# Patient Record
Sex: FEMALE | Race: WHITE
[De-identification: names, ages, dates, MRNs, and addresses within clinical notes are randomized per-mention and may not be internally consistent; named-entity substitution may affect disease eponyms.]

---

## 2017-06-19 NOTE — [facility] DISCHARGE SUMMARY E
[facility] Discharge Summary



NAME: MICHAEL PAGE

                                      MRN: [id number]

                                AGE: 76Y

ADMITTED: 06/15/2017           DISCHARGED: 06/15/2017



HISTORY OF PRESENT ILLNESS AND HOSPITAL COURSE:

This is a 76-year-old female who underwent cataract extraction of her left

eye.



DIAGNOSIS:

Cataract, left eye.



HOSPITAL COURSE:

She underwent surgery because she was having difficulty with glare from

headlights, difficulty seeing medicine bottles.



DISCHARGE INSTRUCTIONS:

1.  She should be on a regular diet.

2.  No bending at her waist and no heavy lifting.

3.  She should use her Besivance, Ilevro, and Durezol at 3 p.m. and 8 p.m.

and sleep with a rigid shield.

4.  I will see her for her one-day postoperative tomorrow.







DICTATING PHYSICIAN: VALERIA HILL M.D.



1272M              DT: 06/19/2017 1153

PHY#: 2011         DD: 06/19/2017 1134

ID:   [id number]               JOB#: [id number]       ACCT: [id number]



cc:VALERIA HILL M.D.

>

## 2017-06-19 NOTE — [facility] OPERATIVE REPORT E
[facility] Operative Report



NAME: MICHAEL PAGE

                                      MRN: [id number]

                             AGE: 76Y

DATE OF SURGERY: 06/15/2017                    ROOM:



PREOPERATIVE DIAGNOSIS:

Cataract, left eye.



POSTOPERATIVE DIAGNOSIS:

Cataract, left eye.



OPERATION:

Cataract extraction with intraocular lens implant of the left eye.



SURGEON:

VALERIA HILL M.D.



ANESTHESIA:

Topical.



PROCEDURE:

After obtaining appropriate consent, the patient's left eye was prepped and

draped in sterile fashion as well as the surgeon in a sterile manner and

cataract surgery was started.  First a paracentesis blade was used to make

a small side-port incision.  Viscoelastic was used to inflate the anterior

chamber.  Next a 2.4 mm incision was made with the paracentesis blade.  A

continuous capsulorrhexis incision was made using a cystotome and Utrata

forceps.  Following this hydrodissection was carried out to make the lens

fully loose and mobile and it was rotated 90 degrees.  Following this, a

divide-and-conquer technique was used to phacoemulsify the lens with a CDE

of 6.69. The remaining cortex was removed with irrigation/aspiration. 

Provisc was instilled into the capsular bag to inflate the bag.  A SN60WF,

20.0 diopter lens was placed.  The remaining viscoelastic material was

removed with irrigation/aspiration.  Following this, a 10-0 nylon suture

was used to close the incision and it was found to be watertight.  Vigamox

was instilled in the eye and a protective shield was placed over the eye. 

The patient returned to the postoperative recovery in stable condition.





DICTATING PHYSICIAN: VALREIA HILL M.D.



1272M              DT: 06/19/2017 1151

PHY#: 2011         DD: 06/19/2017 1134

ID:   [id number]               JOB#: [id number]       ACCT: [id number]



cc:VALERIA HILL M.D.

>

## 2019-01-31 NOTE — [facility] DISCHARGE SUMMARY E
[facility] Discharge Summary



NAME: MICHAEL PAGE

                                      MRN: [id number]

                                AGE: 78Y

ADMITTED: 01/31/2019           DISCHARGED:



This is a 78-year-old female who underwent cataract extraction of the right

eye.



DIAGNOSIS:

Cataract right eye.



She underwent surgery because she was having trouble seeing words on the

television.  She should be on a regular diet.  No bending at the waist and

no heavy lifting.  She should use her Besivance, Prolensa, and Durezol at

3:00 p.m. and 8:00 p.m. and sleep with a rigid shield.  I will see her for

her 1-day postop tomorrow.





DICTATING PHYSICIAN: VALERIA HILL M.D.



1217M              DT: 01/31/2019 2108

PHY#: 2011         DD: 01/31/2019 2050

ID:   [id number]               JOB#: [id number]       ACCT: [id number]



cc:VALERIA HILL M.D.

>

## 2019-01-31 NOTE — [facility] OPERATIVE REPORT E
[facility] Operative Report



NAME: MICHAEL PAGE

                                      MRN: [id number]

                             AGE: 78Y

DATE OF SURGERY: 01/31/2019                   ROOM:



 

PREOPERATIVE DIAGNOSIS:

CATARACT, RIGHT EYE.



POSTOPERATIVE DIAGNOSIS:

CATARACT, RIGHT EYE.



OPERATION:

Cataract extraction with insertion of an IOL of the right eye.



SURGEON:

VALERIA HILL M.D.



ANESTHESIA:

Topical.



PROCEDURE:

After obtaining appropriate consent, the patient's right eye was prepped

and draped in sterile fashion as well as the surgeon in a sterile manner

and cataract surgery was started.  First a paracentesis blade was used to

make a side-port incision.  Viscoelastic was used to inflate the anterior

chamber.  Next a 2.4 mm incision was made with a 2.4 mm blade, clear

corneal temporally.  A continuous capsulorrhexis was made using a cystotome

and Utrata forceps.  Following this hydrodissection was carried out to make

the lens fully loose and mobile and it was rotated 90 degrees.  Following

this, a divide-and-conquer technique was used to phacoemulsify the lens

with a CDE of 7.71. The remaining cortex was removed with

irrigation/aspiration.  Provisc was instilled into the capsular bag to

inflate the bag.  A SN60WF, 19.0 diopter lens was placed.  The remaining

viscoelastic material was removed with irrigation/aspiration.  Following

this, the incision was found to be watertight.  Besivance was instilled

into the eye and a protective shield was placed over the eye.   The patient

returned to the postoperative recovery in stable condition.





DICTATING PHYSICIAN: VALERIA HILL M.D.



1217M              DT: 01/31/2019 2106

PHY#: 2011         DD: 01/31/2019 2050

ID:   [id number]               JOB#: [id number]       ACCT: [id number]



cc:VALERIA HILL M.D.

>

## 2019-08-07 NOTE — RADIOLOGY REPORT (SQ)
EXAM DESCRIPTION:  BARIUM SWALLOW ESOPHAGUS



COMPLETED DATE/TIME:  8/7/2019 8:55 am



REASON FOR STUDY:  EARLY SATIETY (R68.81) R68.81  EARLY SATIETY



COMPARISON:  None.



TECHNIQUE:  Under fluoroscopic guidance, patient ingested effervescent granules followed by thick and
 thin barium. Fluoroscopic spot images and routine radiographic images acquired and stored on PACS.

12 MM BARIUM TABLET GIVEN: Yes.

No significant delay in passage.



LIMITATIONS:  None.



FLUOROSCOPY TIME:  FLUORO TIME: 1 minutes 42 seconds of fluoroscopy was used.

8 images saved to PACS.



FINDINGS:  NEUROMUSCULAR COORDINATION OF SWALLOW: Normal. No aspiration.

ESOPHAGEAL MOTILITY: Weak primary peristalsis with tertiary contractions in the distal half esophagus
.

ESOPHAGEAL MUCOSA: Normal mucosa without masses or ulceration.

GASTRO-ESOPHAGEAL JUNCTION: No hiatal hernia.  Mild gastroesophageal reflux.  12 mm barium tablet pas
sed through the GE junction without delay.

NON-GI TRACT STRUCTURES: No significant finding.

OTHER: No other significant finding.



IMPRESSION:  ESOPHAGEAL DYSMOTILITY WITH REFLUX.



COMMENT:  Quality :  Final reports for procedures using fluoroscopy that document radiation exp
osure indices, or exposure time and number of fluorographic images (if radiation exposure indices are
 not available)



TECHNICAL DOCUMENTATION:  JOB ID:  [id number]

 2011 [employer]- All Rights Reserved



Reading location - IP/workstation name: [email or web address]

## 2019-10-09 ENCOUNTER — HOSPITAL ENCOUNTER (OUTPATIENT)
Dept: HOSPITAL 62 - END | Age: 79
Discharge: HOME | End: 2019-10-09
Attending: INTERNAL MEDICINE
Payer: MEDICARE

## 2019-10-09 VITALS — DIASTOLIC BLOOD PRESSURE: 65 MMHG | SYSTOLIC BLOOD PRESSURE: 132 MMHG

## 2019-10-09 DIAGNOSIS — Z87.891: ICD-10-CM

## 2019-10-09 DIAGNOSIS — Z79.51: ICD-10-CM

## 2019-10-09 DIAGNOSIS — E03.9: ICD-10-CM

## 2019-10-09 DIAGNOSIS — Z79.899: ICD-10-CM

## 2019-10-09 DIAGNOSIS — I10: ICD-10-CM

## 2019-10-09 DIAGNOSIS — K29.50: Primary | ICD-10-CM

## 2019-10-09 DIAGNOSIS — K31.7: ICD-10-CM

## 2019-10-09 PROCEDURE — 43239 EGD BIOPSY SINGLE/MULTIPLE: CPT

## 2019-10-09 PROCEDURE — 88305 TISSUE EXAM BY PATHOLOGIST: CPT

## 2019-10-09 PROCEDURE — 88342 IMHCHEM/IMCYTCHM 1ST ANTB: CPT

## 2019-10-09 PROCEDURE — 88341 IMHCHEM/IMCYTCHM EA ADD ANTB: CPT

## 2019-10-09 RX ADMIN — MIDAZOLAM HYDROCHLORIDE ONE MG: 1 INJECTION, SOLUTION INTRAMUSCULAR; INTRAVENOUS at 08:03

## 2019-10-09 RX ADMIN — MIDAZOLAM HYDROCHLORIDE ONE MG: 1 INJECTION, SOLUTION INTRAMUSCULAR; INTRAVENOUS at 08:00

## 2019-10-09 NOTE — OPERATIVE REPORT
Operative Report


DATE OF SURGERY: 10/09/19


Operative Report: 





The risks benefits and alternatives of the procedure explained to the patient in

detail and informed consent is obtained.A GIF Olympus video scope was inserted 

into the patient's mouth and hypopharynx, the esophagus is identified intubated 

and insufflated, the scope was then advanced through the esophagus stomach and 

duodenum, retroflexion maneuver is done, the esophagus stomach and first and 

second portions of the duodenum examined.


PREOPERATIVE DIAGNOSIS: Epigastric pain


POSTOPERATIVE DIAGNOSIS: Gastritis status post biopsy rule out Helicobacter 

pylori.  Gastric nodule/polyp status post biopsy it is suggestive of a 

inflammatory polyp


OPERATION: EGD with biopsy


SURGEON: DARYN LECHUGA


ANESTHESIA: Moderate Sedation - 3 mg of Versed, 25 mcg of fentanyl.  Conscious 

sedation monitoring time 30 minutes.


TISSUE REMOVED OR ALTERED: As noted above.


COMPLICATIONS: 





None.


ESTIMATED BLOOD LOSS: None.


INTRAOPERATIVE FINDINGS: As noted above.


PROCEDURE: 





Patient tolerated the procedure well.


No immediate postprocedure complications are noted.


Patient is discharged in good condition.


Discharge date 10/9/2019.


Discharge diet: Regular.


Discharge activity: Regular.


2 to 3-week follow-up to discuss findings.


Patient is instructed to call the office or proceed to the emergency room should

there be any further problems or questions.


Wait on the pathology.

## 2019-10-30 ENCOUNTER — HOSPITAL ENCOUNTER (OUTPATIENT)
Dept: HOSPITAL 62 - RAD | Age: 79
End: 2019-10-30
Attending: SURGERY
Payer: MEDICARE

## 2019-10-30 DIAGNOSIS — K80.20: ICD-10-CM

## 2019-10-30 DIAGNOSIS — I70.90: ICD-10-CM

## 2019-10-30 DIAGNOSIS — K57.30: ICD-10-CM

## 2019-10-30 DIAGNOSIS — K31.89: Primary | ICD-10-CM

## 2019-10-30 PROCEDURE — 82565 ASSAY OF CREATININE: CPT

## 2019-10-30 PROCEDURE — 74177 CT ABD & PELVIS W/CONTRAST: CPT

## 2019-10-30 NOTE — RADIOLOGY REPORT (SQ)
EXAM DESCRIPTION:  CT ABD/PELVIS WITH IV   ORAL



COMPLETED DATE/TIME:  10/30/2019 10:23 am



REASON FOR STUDY:  (K31.89)OTHER DISEASES OF STOMACH AND DUODENUM K31.89  OTHER DISEASES OF STOMACH A
ND DUODENUM



COMPARISON:  None.



TECHNIQUE:  CT scan of the abdomen and pelvis performed with intravenous and oral contrast using antoinette
sue scanning technique with dynamic intravenous contrast injection. Images reviewed with lung, soft t
issue, and bone windows. Reconstructed coronal and sagittal MPR images reviewed. Delayed images for e
valuation of the urinary system also acquired. All images stored on PACS.

All CT scanners at this facility use dose modulation, iterative reconstruction, and/or weight based d
osing when appropriate to reduce radiation dose to as low as reasonably achievable (ALARA).

[facility]: Dose Right  [facility]: CareDose    [facility]: Dose Right    [facility]: Teradose 4D    [facility]: Smart Technologies



CONTRAST TYPE AND DOSE:  contrast/concentration: Isovue 350.00 mg/ml; Total Contrast Delivered: 80.0 
ml; Total Saline Delivered: 68.0 ml



RENAL FUNCTION:  GFR > 60.



RADIATION DOSE:  CT Rad equipment meets quality standard of care and radiation dose reduction techniq
ues were employed. CTDIvol: 9.3 - 11.0 mGy. DLP: 966 mGy-cm..



LIMITATIONS:  None.



FINDINGS:  LOWER CHEST: No significant findings. No nodules or infiltrates.

LIVER: Tiny subcentimeter low-density lesion in the central liver without gross enhancement.  Probabl
e cyst but too small to further characterize.  Liver otherwise normal.

SPLEEN: Normal size. No focal lesions.

PANCREAS: No masses. No significant calcifications. No adjacent inflammation or peripancreatic fluid 
collections. Pancreatic duct not dilated.

GALLBLADDER: Cholelithiasis.  No CT evidence acute cholecystitis.

ADRENAL GLANDS: No significant masses or asymmetry.

RIGHT KIDNEY AND URETER: No solid masses. No significant calcification. No hydronephrosis or hydroure
ter.

LEFT KIDNEY AND URETER: No solid masses. No significant calcification. No hydronephrosis or hydrouret
er.

AORTA AND VESSELS: Atherosclerotic.  No aneurysm or dissection.  Major arterial and venous structures
 look patent.

RETROPERITONEUM: No retroperitoneal adenopathy, hemorrhage or masses.

BOWEL AND PERITONEAL CAVITY: No gross gastric mass allowing for limited evaluation.  No small bowel o
bstruction.  No significant hiatal hernia.  The cecum projects well across the midline into the left 
lower quadrant.  No overtly suspicious wall thickening.  There is diverticulosis throughout the sigmo
id colon which is pronounced.  Doubt active diverticulitis.

APPENDIX: Normal.

PELVIS: No significant masses.  Normal bladder. No free fluid.

ABDOMINAL WALL: No masses. No hernias.

BONES: No significant or acute findings.

OTHER: No other significant finding.



IMPRESSION:

1. Cholelithiasis without CT evidence of acute cholecystitis.

2. Extensive sigmoid diverticulosis without CT suggestion of active diverticulitis.

3. Atherosclerosis.



TECHNICAL DOCUMENTATION:  JOB ID:  [id number]

Quality ID # [id number]: Final reports with documentation of one or more dose reduction techniques (e.g., Au
tomated exposure control, adjustment of the mA and/or kV according to patient size, use of iterative 
reconstruction technique)

 2011 [employer]- All Rights Reserved



Reading location - IP/workstation name: [email or web address]

## 2019-11-25 LAB
ADD MANUAL DIFF: NO
ANION GAP SERPL CALC-SCNC: 10 MMOL/L (ref 5–19)
BASOPHILS # BLD AUTO: 0.1 10^3/UL (ref 0–0.2)
BASOPHILS NFR BLD AUTO: 1.7 % (ref 0–2)
BUN SERPL-MCNC: 16 MG/DL (ref 7–20)
CALCIUM: 9.5 MG/DL (ref 8.4–10.2)
CHLORIDE SERPL-SCNC: 105 MMOL/L (ref 98–107)
CO2 SERPL-SCNC: 27 MMOL/L (ref 22–30)
EOSINOPHIL # BLD AUTO: 0.1 10^3/UL (ref 0–0.6)
EOSINOPHIL NFR BLD AUTO: 2.1 % (ref 0–6)
ERYTHROCYTE [DISTWIDTH] IN BLOOD BY AUTOMATED COUNT: 14.5 % (ref 11.5–14)
GLUCOSE SERPL-MCNC: 92 MG/DL (ref 75–110)
HCT VFR BLD CALC: 37.7 % (ref 36–47)
HGB BLD-MCNC: 12.7 G/DL (ref 12–15.5)
LYMPHOCYTES # BLD AUTO: 1.2 10^3/UL (ref 0.5–4.7)
LYMPHOCYTES NFR BLD AUTO: 28.1 % (ref 13–45)
MCH RBC QN AUTO: 28.4 PG (ref 27–33.4)
MCHC RBC AUTO-ENTMCNC: 33.7 G/DL (ref 32–36)
MCV RBC AUTO: 84 FL (ref 80–97)
MONOCYTES # BLD AUTO: 0.3 10^3/UL (ref 0.1–1.4)
MONOCYTES NFR BLD AUTO: 8.2 % (ref 3–13)
NEUTROPHILS # BLD AUTO: 2.5 10^3/UL (ref 1.7–8.2)
NEUTS SEG NFR BLD AUTO: 59.9 % (ref 42–78)
PLATELET # BLD: 187 10^3/UL (ref 150–450)
POTASSIUM SERPL-SCNC: 4.1 MMOL/L (ref 3.6–5)
RBC # BLD AUTO: 4.47 10^6/UL (ref 3.72–5.28)
TOTAL CELLS COUNTED % (AUTO): 100 %
WBC # BLD AUTO: 4.2 10^3/UL (ref 4–10.5)

## 2019-12-03 ENCOUNTER — HOSPITAL ENCOUNTER (INPATIENT)
Dept: HOSPITAL 62 - OROUT | Age: 79
LOS: 3 days | Discharge: HOME | DRG: 328 | End: 2019-12-06
Attending: SURGERY | Admitting: SURGERY
Payer: MEDICARE

## 2019-12-03 DIAGNOSIS — Z23: ICD-10-CM

## 2019-12-03 DIAGNOSIS — Z88.8: ICD-10-CM

## 2019-12-03 DIAGNOSIS — I10: ICD-10-CM

## 2019-12-03 DIAGNOSIS — Z79.899: ICD-10-CM

## 2019-12-03 DIAGNOSIS — Z79.890: ICD-10-CM

## 2019-12-03 DIAGNOSIS — Z87.891: ICD-10-CM

## 2019-12-03 DIAGNOSIS — K80.50: ICD-10-CM

## 2019-12-03 DIAGNOSIS — K31.89: Primary | ICD-10-CM

## 2019-12-03 PROCEDURE — S0028 INJECTION, FAMOTIDINE, 20 MG: HCPCS

## 2019-12-03 PROCEDURE — 0D164ZA BYPASS STOMACH TO JEJUNUM, PERCUTANEOUS ENDOSCOPIC APPROACH: ICD-10-PCS | Performed by: SURGERY

## 2019-12-03 PROCEDURE — 93005 ELECTROCARDIOGRAM TRACING: CPT

## 2019-12-03 PROCEDURE — 88305 TISSUE EXAM BY PATHOLOGIST: CPT

## 2019-12-03 PROCEDURE — 0DHA3UZ INSERTION OF FEEDING DEVICE INTO JEJUNUM, PERCUTANEOUS APPROACH: ICD-10-PCS | Performed by: SURGERY

## 2019-12-03 PROCEDURE — 88342 IMHCHEM/IMCYTCHM 1ST ANTB: CPT

## 2019-12-03 PROCEDURE — 86900 BLOOD TYPING SEROLOGIC ABO: CPT

## 2019-12-03 PROCEDURE — 86901 BLOOD TYPING SEROLOGIC RH(D): CPT

## 2019-12-03 PROCEDURE — 85025 COMPLETE CBC W/AUTO DIFF WBC: CPT

## 2019-12-03 PROCEDURE — 93010 ELECTROCARDIOGRAM REPORT: CPT

## 2019-12-03 PROCEDURE — 80048 BASIC METABOLIC PNL TOTAL CA: CPT

## 2019-12-03 PROCEDURE — C9290 INJ, BUPIVACAINE LIPOSOME: HCPCS

## 2019-12-03 PROCEDURE — 90686 IIV4 VACC NO PRSV 0.5 ML IM: CPT

## 2019-12-03 PROCEDURE — 36415 COLL VENOUS BLD VENIPUNCTURE: CPT

## 2019-12-03 PROCEDURE — 83690 ASSAY OF LIPASE: CPT

## 2019-12-03 PROCEDURE — 86850 RBC ANTIBODY SCREEN: CPT

## 2019-12-03 PROCEDURE — 0FT44ZZ RESECTION OF GALLBLADDER, PERCUTANEOUS ENDOSCOPIC APPROACH: ICD-10-PCS | Performed by: SURGERY

## 2019-12-03 PROCEDURE — 0DB74ZZ EXCISION OF STOMACH, PYLORUS, PERCUTANEOUS ENDOSCOPIC APPROACH: ICD-10-PCS | Performed by: SURGERY

## 2019-12-03 PROCEDURE — 88331 PATH CONSLTJ SURG 1 BLK 1SPC: CPT

## 2019-12-03 RX ADMIN — HYDROMORPHONE HYDROCHLORIDE ONE MG: 2 INJECTION INTRAMUSCULAR; INTRAVENOUS; SUBCUTANEOUS at 12:39

## 2019-12-03 RX ADMIN — METRONIDAZOLE SCH MLS/HR: 500 INJECTION, SOLUTION INTRAVENOUS at 14:18

## 2019-12-03 RX ADMIN — FAMOTIDINE SCH MG: 10 INJECTION INTRAVENOUS at 21:25

## 2019-12-03 RX ADMIN — HEPARIN SODIUM SCH: 5000 INJECTION, SOLUTION INTRAVENOUS; SUBCUTANEOUS at 21:47

## 2019-12-03 RX ADMIN — Medication SCH MG: at 18:07

## 2019-12-03 RX ADMIN — HYDROMORPHONE HYDROCHLORIDE PRN MG: 2 INJECTION INTRAMUSCULAR; INTRAVENOUS; SUBCUTANEOUS at 21:40

## 2019-12-03 RX ADMIN — METRONIDAZOLE SCH MLS/HR: 500 INJECTION, SOLUTION INTRAVENOUS at 18:47

## 2019-12-03 RX ADMIN — DEXTROSE, SODIUM CHLORIDE, AND POTASSIUM CHLORIDE PRN MLS/HR: 5; .45; .15 INJECTION INTRAVENOUS at 19:37

## 2019-12-03 RX ADMIN — CEFAZOLIN SCH MLS/HR: 1 INJECTION, POWDER, FOR SOLUTION INTRAVENOUS at 21:43

## 2019-12-03 RX ADMIN — DEXAMETHASONE SODIUM PHOSPHATE PRN MG: 10 INJECTION INTRAMUSCULAR; INTRAVENOUS at 15:07

## 2019-12-03 RX ADMIN — DEXAMETHASONE SODIUM PHOSPHATE PRN MG: 10 INJECTION INTRAMUSCULAR; INTRAVENOUS at 21:25

## 2019-12-03 RX ADMIN — HEPARIN SODIUM SCH: 5000 INJECTION, SOLUTION INTRAVENOUS; SUBCUTANEOUS at 13:54

## 2019-12-03 RX ADMIN — HYDROMORPHONE HYDROCHLORIDE ONE MG: 2 INJECTION INTRAMUSCULAR; INTRAVENOUS; SUBCUTANEOUS at 12:29

## 2019-12-03 RX ADMIN — Medication SCH: at 13:54

## 2019-12-03 RX ADMIN — MORPHINE SULFATE PRN MG: 10 INJECTION INTRAMUSCULAR; INTRAVENOUS; SUBCUTANEOUS at 14:18

## 2019-12-03 NOTE — OPERATIVE REPORT
Nonrecallable Operative Report


DATE OF SURGERY: 12/03/19


PREOPERATIVE DIAGNOSIS: gastric mass, cholelithiasis


POSTOPERATIVE DIAGNOSIS: gastric mass, cholelilthiasis


OPERATION: laparoscopic partial gastrectomy and cholecystectomy


SURGEON: URIEL CARTER


1ST ASSISTANT: PAT GIL


ANESTHESIA: GA


TISSUE REMOVED OR ALTERED: gastric antrum and gallbladder


COMPLICATIONS: 





none





ESTIMATED BLOOD LOSS: 75


INTRAOPERATIVE FINDINGS: see dictation


PROCEDURE: 





Patient was brought to the operating room awake alert in stable condition placed

on the operating table supine position induced under general anesthesia 

intubated.  A Humphrey catheter was placed.  After appropriate timeout site 

verification the procedure commenced.





Veress needle was placed into the umbilicus and the abdomen was insufflated with

6 L of CO2 gas infra umbilical 10 mm incision was made with a 15 blade and a 10 

mm port placed in the abdominal cavity intra-abdominal visualization revealed no

evidence of Veress needle or trocar injury.





Under direct vision a 5 mm right upper quadrant trocar was placed a right lower 

quadrant 12 mm port was placed in a left-sided 5 mm port and an epigastric 5 mm 

port all under direct vision.





The stomach was identified the mass could not easily be seen on the serosal side

of the stomach.  The duodenum was also identified.  There was some adhesions of 

the duodenum to the gallbladder which were taken down with sharp dissection 

using the LigaSure device.





Then using the LigaSure device we gained access to the lesser sac by dissecting 

the gastrocolic ligament away from the greater curvature the stomach from the 

duodenum all the way to the incisura.  Once this was accomplished we continued 

our dissection right word to mobilize the first portion of the duodenum and 

identified the pylorus.





We then came across the first portion of the duodenum just distal to the pylorus

with one firing of the Endo KENRICK stapler with a blue load.  The staple line was 

reinforced with interrupted 2-0 silk.





We then completely mobilized the lesser curvature the stomach with the LigaSure 

device and then came across the distal stomach proximal to the incisura with 2 

firings of the Endo KENRICK stapler with a blue load.





The gastric antrum was removed and examined and was opened and we noted the mass

just proximal to the pylorus there was sent that down the pathology.





Pathology report came back as the mass was benign.





We then turned attention to the ligament of Treitz a point was picked distal 

from the ligament Treitz about 40 cm we divided the small bowel with the Endo 

KENRICK stapler with a blue load.  We then divided the mesentery with LigaSure 

device.  We created an enteroenterostomy between the biliary limb and the 

alimentary limb about 60 cm distal from the end of the staple line  on the end 

of the Boogie limb.





This was done with the Endo KENRICK stapler the staple holes were closed 

transversely with a stapler that we were reinforced with 2-0 silk and the 

mesenteric defect was closed with 3-0 Maxon.





We then used the oral device 25 mm we passed into the mouth setting the anvil in

the distal stomach.





We placed the EEA stapler to the left upper quadrant incision into the end of 

the rhythm appears to the outside connected to the anvil and the  stomach closed

and fired creating a gastrojejunostomy.  That was also reinforced with 

interrupted 2-0 silk.





The donuts were examined and they were intact.





We then turned attention to the gallbladder was placed on traction the 

hepatoduodenal ligament was dissected isolated and cystic duct and cystic artery

both the structures were doubly ligated with a Endo Clip and divided leaving 2 

Endoloops on the stay side of both duct and artery.





The gallbladder was then dissected out of the liver bed with Bovie cautery 

placed in Endobag and removed through the right lower quadrant port site.  

Hemostasis of the liver bed was obtained with Bovie cautery.





After good hemostasis the ned hepatis was examined again for examination of 

the duct stump and it was noted to be intact the surgical anastomoses were all 

again reexamined and all noted to be intact without any twists or defects in the

mesentery.  We then used a Thanh drain left in the ned hepatis next to the 

duodenal stump and in the ned hepatis and brought that out through a stab 

wound in the right upper quadrant.





We then reduce the pneumoperitoneum remove the ports closed the right lower 

quadrant and left lower quadrant fascial defects with 0 Vicryl in the fascia and

closed the umbilical port site with 0 Vicryl in the fascia.





The skin was then closed with intracuticular 4-0 Biosyn and then Steri-Strips 

completed the procedure.





Estimated blood loss for the procedure was 75 cc sponge and needle counts were 

correct x2.





The patient was awakened in the operating extubated transferred recovery in 

stable condition.





Pat PARSONS was present for the entire procedure for help with wound 

retraction wound closure.

## 2019-12-04 LAB
ADD MANUAL DIFF: NO
ANION GAP SERPL CALC-SCNC: 6 MMOL/L (ref 5–19)
BASOPHILS # BLD AUTO: 0 10^3/UL (ref 0–0.2)
BASOPHILS NFR BLD AUTO: 0.1 % (ref 0–2)
BUN SERPL-MCNC: 15 MG/DL (ref 7–20)
CALCIUM: 8.4 MG/DL (ref 8.4–10.2)
CHLORIDE SERPL-SCNC: 104 MMOL/L (ref 98–107)
CO2 SERPL-SCNC: 27 MMOL/L (ref 22–30)
EOSINOPHIL # BLD AUTO: 0 10^3/UL (ref 0–0.6)
EOSINOPHIL NFR BLD AUTO: 0 % (ref 0–6)
ERYTHROCYTE [DISTWIDTH] IN BLOOD BY AUTOMATED COUNT: 14.3 % (ref 11.5–14)
GLUCOSE SERPL-MCNC: 121 MG/DL (ref 75–110)
HCT VFR BLD CALC: 33.4 % (ref 36–47)
HGB BLD-MCNC: 11.2 G/DL (ref 12–15.5)
LYMPHOCYTES # BLD AUTO: 0.7 10^3/UL (ref 0.5–4.7)
LYMPHOCYTES NFR BLD AUTO: 6.9 % (ref 13–45)
MCH RBC QN AUTO: 28.3 PG (ref 27–33.4)
MCHC RBC AUTO-ENTMCNC: 33.5 G/DL (ref 32–36)
MCV RBC AUTO: 84 FL (ref 80–97)
MONOCYTES # BLD AUTO: 0.6 10^3/UL (ref 0.1–1.4)
MONOCYTES NFR BLD AUTO: 6.2 % (ref 3–13)
NEUTROPHILS # BLD AUTO: 8.8 10^3/UL (ref 1.7–8.2)
NEUTS SEG NFR BLD AUTO: 86.8 % (ref 42–78)
PLATELET # BLD: 168 10^3/UL (ref 150–450)
POTASSIUM SERPL-SCNC: 3.9 MMOL/L (ref 3.6–5)
RBC # BLD AUTO: 3.96 10^6/UL (ref 3.72–5.28)
TOTAL CELLS COUNTED % (AUTO): 100 %
WBC # BLD AUTO: 10.1 10^3/UL (ref 4–10.5)

## 2019-12-04 RX ADMIN — Medication SCH MG: at 17:32

## 2019-12-04 RX ADMIN — DEXTROSE, SODIUM CHLORIDE, AND POTASSIUM CHLORIDE PRN MLS/HR: 5; .45; .15 INJECTION INTRAVENOUS at 20:14

## 2019-12-04 RX ADMIN — HYDROMORPHONE HYDROCHLORIDE PRN MG: 2 INJECTION INTRAMUSCULAR; INTRAVENOUS; SUBCUTANEOUS at 01:58

## 2019-12-04 RX ADMIN — LEVOTHYROXINE SODIUM SCH: 50 TABLET ORAL at 10:23

## 2019-12-04 RX ADMIN — HEPARIN SODIUM SCH: 5000 INJECTION, SOLUTION INTRAVENOUS; SUBCUTANEOUS at 06:13

## 2019-12-04 RX ADMIN — METRONIDAZOLE SCH MLS/HR: 500 INJECTION, SOLUTION INTRAVENOUS at 06:38

## 2019-12-04 RX ADMIN — HYDROMORPHONE HYDROCHLORIDE PRN MG: 2 INJECTION INTRAMUSCULAR; INTRAVENOUS; SUBCUTANEOUS at 23:18

## 2019-12-04 RX ADMIN — METOCLOPRAMIDE SCH MG: 5 INJECTION, SOLUTION INTRAMUSCULAR; INTRAVENOUS at 17:32

## 2019-12-04 RX ADMIN — CEFAZOLIN SCH MLS/HR: 1 INJECTION, POWDER, FOR SOLUTION INTRAVENOUS at 05:35

## 2019-12-04 RX ADMIN — CEFAZOLIN SCH MLS/HR: 1 INJECTION, POWDER, FOR SOLUTION INTRAVENOUS at 21:53

## 2019-12-04 RX ADMIN — FAMOTIDINE SCH MG: 10 INJECTION INTRAVENOUS at 09:10

## 2019-12-04 RX ADMIN — Medication SCH MG: at 23:17

## 2019-12-04 RX ADMIN — DEXAMETHASONE SODIUM PHOSPHATE PRN MG: 10 INJECTION INTRAMUSCULAR; INTRAVENOUS at 05:46

## 2019-12-04 RX ADMIN — HEPARIN SODIUM SCH UNIT: 5000 INJECTION, SOLUTION INTRAVENOUS; SUBCUTANEOUS at 09:10

## 2019-12-04 RX ADMIN — DEXAMETHASONE SODIUM PHOSPHATE PRN MG: 10 INJECTION INTRAMUSCULAR; INTRAVENOUS at 20:07

## 2019-12-04 RX ADMIN — METOCLOPRAMIDE SCH MG: 5 INJECTION, SOLUTION INTRAMUSCULAR; INTRAVENOUS at 23:17

## 2019-12-04 RX ADMIN — METOCLOPRAMIDE SCH MG: 5 INJECTION, SOLUTION INTRAMUSCULAR; INTRAVENOUS at 11:05

## 2019-12-04 RX ADMIN — METRONIDAZOLE SCH MLS/HR: 500 INJECTION, SOLUTION INTRAVENOUS at 00:45

## 2019-12-04 RX ADMIN — DEXTROSE, SODIUM CHLORIDE, AND POTASSIUM CHLORIDE PRN MLS/HR: 5; .45; .15 INJECTION INTRAVENOUS at 09:48

## 2019-12-04 RX ADMIN — MORPHINE SULFATE PRN MG: 10 INJECTION INTRAMUSCULAR; INTRAVENOUS; SUBCUTANEOUS at 13:42

## 2019-12-04 RX ADMIN — HEPARIN SODIUM SCH UNIT: 5000 INJECTION, SOLUTION INTRAVENOUS; SUBCUTANEOUS at 21:54

## 2019-12-04 RX ADMIN — CEFAZOLIN SCH MLS/HR: 1 INJECTION, POWDER, FOR SOLUTION INTRAVENOUS at 13:34

## 2019-12-04 RX ADMIN — Medication SCH MG: at 06:11

## 2019-12-04 RX ADMIN — DEXAMETHASONE SODIUM PHOSPHATE PRN MG: 10 INJECTION INTRAMUSCULAR; INTRAVENOUS at 13:42

## 2019-12-04 RX ADMIN — FAMOTIDINE SCH MG: 10 INJECTION INTRAVENOUS at 21:54

## 2019-12-04 RX ADMIN — Medication SCH MG: at 01:59

## 2019-12-04 RX ADMIN — Medication SCH MG: at 11:05

## 2019-12-04 NOTE — PDOC PROGRESS REPORT
Subjective


Progress Note for:: 12/04/19


Subjective:: 





feels ok


pain better,


has had some nausea, dry heaves


Reason For Visit: 


K31.89 OTHER DISEASES OF STOMACH AND DUODENUM








Physical Exam


Vital Signs: 


                                        











Temp Pulse Resp BP Pulse Ox


 


 98.7 F   84   18   140/69 H  99 


 


 12/04/19 00:00  12/04/19 00:00  12/04/19 00:00  12/04/19 00:00  12/04/19 04:47








                                 Intake & Output











 12/03/19 12/04/19 12/05/19





 06:59 06:59 06:59


 


Intake Total 0 3650 


 


Output Total  1365 


 


Balance 0 2285 


 


Weight 71.21 kg 81.2 kg 











General appearance: PRESENT: no acute distress


Head exam: PRESENT: normocephalic


Eye exam: PRESENT: EOMI


Mouth exam: PRESENT: moist


Neck exam: PRESENT: full ROM


Respiratory exam: PRESENT: clear to auscultation leyla


Cardiovascular exam: PRESENT: RRR


Pulses: PRESENT: normal radial pulses, normal femoral pulses


GI/Abdominal exam: PRESENT: soft


Rectal exam: PRESENT: deferred


Extremities exam: PRESENT: full ROM


Musculoskeletal exam: PRESENT: full ROM


Neurological exam: PRESENT: alert, awake, oriented to person, oriented to place


Skin exam: PRESENT: dry





Results


Laboratory Results: 


                                        





                                 12/04/19 06:05 





                                 12/04/19 06:05 





                                        











  12/03/19 12/04/19 12/04/19





  07:55 06:05 06:05


 


WBC   10.1 


 


RBC   3.96 


 


Hgb   11.2 L 


 


Hct   33.4 L 


 


MCV   84 


 


MCH   28.3 


 


MCHC   33.5 


 


RDW   14.3 H 


 


Plt Count   168 


 


Seg Neutrophils %   86.8 H 


 


Sodium    136.5 L


 


Potassium    3.9


 


Chloride    104


 


Carbon Dioxide    27


 


Anion Gap    6


 


BUN    15


 


Creatinine    0.51 L


 


Est GFR ( Amer)    > 60


 


Glucose    121 H


 


Calcium    8.4


 


Lipase    50.3


 


Blood Type  A POSITIVE  


 


Antibody Screen  NEGATIVE  














Assessment & Plan





- Time


Time Spent with patient: 25-34 minutes





- Plan Summary


Plan Summary: 





pod 1


s/p partial gastrectomy with angela y reconstruction


s/p cholecystectomy


doing ok this am


min nausea


melisa iwth min op


urine op good


plan


will strt reglan


dc zavaleta


start clears


dc flagyl

## 2019-12-05 RX ADMIN — METOCLOPRAMIDE SCH MG: 5 INJECTION, SOLUTION INTRAMUSCULAR; INTRAVENOUS at 11:09

## 2019-12-05 RX ADMIN — LEVOTHYROXINE SODIUM SCH MG: 50 TABLET ORAL at 05:42

## 2019-12-05 RX ADMIN — Medication SCH: at 17:16

## 2019-12-05 RX ADMIN — HYDROMORPHONE HYDROCHLORIDE PRN MG: 2 INJECTION INTRAMUSCULAR; INTRAVENOUS; SUBCUTANEOUS at 07:44

## 2019-12-05 RX ADMIN — DEXTROSE, SODIUM CHLORIDE, AND POTASSIUM CHLORIDE PRN MLS/HR: 5; .45; .15 INJECTION INTRAVENOUS at 09:00

## 2019-12-05 RX ADMIN — HEPARIN SODIUM SCH UNIT: 5000 INJECTION, SOLUTION INTRAVENOUS; SUBCUTANEOUS at 09:01

## 2019-12-05 RX ADMIN — Medication SCH MG: at 05:40

## 2019-12-05 RX ADMIN — CEFAZOLIN SCH MLS/HR: 1 INJECTION, POWDER, FOR SOLUTION INTRAVENOUS at 05:41

## 2019-12-05 RX ADMIN — DEXAMETHASONE SODIUM PHOSPHATE PRN MG: 10 INJECTION INTRAMUSCULAR; INTRAVENOUS at 07:44

## 2019-12-05 RX ADMIN — Medication SCH MG: at 22:27

## 2019-12-05 RX ADMIN — FAMOTIDINE SCH MG: 10 INJECTION INTRAVENOUS at 09:01

## 2019-12-05 RX ADMIN — FAMOTIDINE SCH MG: 10 INJECTION INTRAVENOUS at 22:28

## 2019-12-05 RX ADMIN — METOCLOPRAMIDE SCH MG: 5 INJECTION, SOLUTION INTRAMUSCULAR; INTRAVENOUS at 17:11

## 2019-12-05 RX ADMIN — Medication SCH MG: at 11:09

## 2019-12-05 RX ADMIN — CEFAZOLIN SCH MLS/HR: 1 INJECTION, POWDER, FOR SOLUTION INTRAVENOUS at 22:27

## 2019-12-05 RX ADMIN — DEXTROSE, SODIUM CHLORIDE, AND POTASSIUM CHLORIDE PRN MLS/HR: 5; .45; .15 INJECTION INTRAVENOUS at 22:34

## 2019-12-05 RX ADMIN — HEPARIN SODIUM SCH UNIT: 5000 INJECTION, SOLUTION INTRAVENOUS; SUBCUTANEOUS at 22:28

## 2019-12-05 RX ADMIN — CEFAZOLIN SCH MLS/HR: 1 INJECTION, POWDER, FOR SOLUTION INTRAVENOUS at 13:36

## 2019-12-05 RX ADMIN — METOCLOPRAMIDE SCH MG: 5 INJECTION, SOLUTION INTRAMUSCULAR; INTRAVENOUS at 05:40

## 2019-12-05 NOTE — PDOC PROGRESS REPORT
Subjective


Progress Note for:: 12/05/19


Subjective:: 





feels ok


vomited yesterday








Reason For Visit: 


K31.89 OTHER DISEASES OF STOMACH AND DUODENUM








Physical Exam


Vital Signs: 


                                        











Temp Pulse Resp BP Pulse Ox


 


 98.3 F   65   14   161/74 H  95 


 


 12/04/19 23:00  12/04/19 23:00  12/04/19 23:00  12/04/19 23:00  12/04/19 23:00








                                 Intake & Output











 12/04/19 12/05/19 12/06/19





 06:59 06:59 06:59


 


Intake Total 4650 1318 


 


Output Total 1365 1050 


 


Balance 3285 268 


 


Weight 81.2 kg 77.2 kg 











General appearance: PRESENT: no acute distress


Head exam: PRESENT: normocephalic


Eye exam: PRESENT: EOMI


Ear exam: PRESENT: normal external ear exam


Mouth exam: PRESENT: dry mucosa


Neck exam: PRESENT: full ROM


Respiratory exam: PRESENT: clear to auscultation leyla


Cardiovascular exam: PRESENT: RRR


Pulses: PRESENT: normal radial pulses, normal femoral pulses


GI/Abdominal exam: PRESENT: soft


Rectal exam: PRESENT: deferred


Extremities exam: PRESENT: full ROM


Musculoskeletal exam: PRESENT: full ROM


Neurological exam: PRESENT: alert, awake, oriented to person, oriented to place


Psychiatric exam: PRESENT: appropriate affect


Skin exam: PRESENT: dry





Results


Laboratory Results: 


                                        





                                 12/04/19 06:05 





                                 12/04/19 06:05 











Assessment & Plan





- Time


Time Spent with patient: 15-24 minutes





- Plan Summary


Plan Summary: 





will start full liquids


dulolax suppositiory today

## 2019-12-06 VITALS — SYSTOLIC BLOOD PRESSURE: 149 MMHG | DIASTOLIC BLOOD PRESSURE: 67 MMHG

## 2019-12-06 PROCEDURE — 3E02340 INTRODUCTION OF INFLUENZA VACCINE INTO MUSCLE, PERCUTANEOUS APPROACH: ICD-10-PCS | Performed by: SURGERY

## 2019-12-06 RX ADMIN — METOCLOPRAMIDE SCH MG: 5 INJECTION, SOLUTION INTRAMUSCULAR; INTRAVENOUS at 06:18

## 2019-12-06 RX ADMIN — METOCLOPRAMIDE SCH MG: 5 INJECTION, SOLUTION INTRAMUSCULAR; INTRAVENOUS at 00:04

## 2019-12-06 RX ADMIN — Medication SCH: at 06:12

## 2019-12-06 RX ADMIN — LEVOTHYROXINE SODIUM SCH MG: 50 TABLET ORAL at 06:18

## 2019-12-06 RX ADMIN — CEFAZOLIN SCH MLS/HR: 1 INJECTION, POWDER, FOR SOLUTION INTRAVENOUS at 06:17

## 2019-12-06 NOTE — PDOC DISCHARGE SUMMARY
General





- Admit/Disc Date/PCP


Admission Date/Primary Care Provider: 


  12/03/19 11:05





  ESTHER SWEET PA-C





Discharge Date: 12/06/19





- Discharge Diagnosis


Final Diagnosis: 





gastric mass, cholelitliasis





- Assessment


Summary: 


Ms. Page was admitted for elective partial gastrectomy and cholecystectomy 

for a known gastric mass seen as an outpatient.  She underwent the procedure on 

the day of admission tolerated well and she had a routine benign postop course 

she was started on clear liquid diet on hospital day 1 which was slowly advanced

to full liquid diet by the time of discharge today she is tolerating a full 

liquid diet having normal bowel function with normal bowel movements and is 

requesting to be discharged home and she is up and around in the hospital and 

having minimal amounts of pain she is ready for discharge today .


She will be discharged home today on a full liquid diet she will be given a 

prescription for Pepcid 20 mg p.o. twice daily she will be followed up in my 

surgery clinic 7 to 10 days after discharge.











- Additional Information


Resuscitation Status: Full Code


Discharge Diet: Full Liquids


Discharge Activity: Activity As Tolerated, No Lifting Over 10 Pounds - She needs

a follow-up appointment with me in 7 to 10 days after discharge


Referrals: 


URIEL CARTER MD [ACTIVE STAFF] -  (1 week.)


Home Medications: 








Levothyroxine Sodium [Synthroid 50 Mcg Tablet] 50 mcg PO Q6AM 05/19/17 


Cetirizine HCl [Zyrtec 10 mg Tablet] 10 mg PO DAILY 10/09/19 


Diphenhydramine HCl [Benadryl 25 mg Capsule] 25 mg PO HSP PRN 10/09/19 


Calcium Carbonate/Vitamin D3 [Calcium 500 + Vit D Caplet] 1 tab PO DAILY 

11/22/19 


Telmisartan [Micardis 20 mg Tablet] 60 mg PO DAILY 12/04/19 











History of Present Illiness


History of Present Illness: 


MICHAEL PAGE is a 79 year old female








Physical Exam


Vital Signs: 


                                        











Temp Pulse Resp BP Pulse Ox


 


 99.1 F   87   14   168/68 H  92 


 


 12/05/19 19:00  12/05/19 19:00  12/05/19 19:00  12/05/19 19:00  12/05/19 19:00








                                 Intake & Output











 12/05/19 12/06/19 12/07/19





 06:59 06:59 06:59


 


Intake Total 2318 3186 


 


Output Total 1050 10 


 


Balance 1268 3176 


 


Weight 77.2 kg 77.2 kg 














Results


Laboratory Results: 


                                        











WBC  10.1 10^3/uL (4.0-10.5)   12/04/19  06:05    


 


RBC  3.96 10^6/uL (3.72-5.28)   12/04/19  06:05    


 


Hgb  11.2 g/dL (12.0-15.5)  L  12/04/19  06:05    


 


Hct  33.4 % (36.0-47.0)  L  12/04/19  06:05    


 


MCV  84 fl (80-97)   12/04/19  06:05    


 


MCH  28.3 pg (27.0-33.4)   12/04/19  06:05    


 


MCHC  33.5 g/dL (32.0-36.0)   12/04/19  06:05    


 


RDW  14.3 % (11.5-14.0)  H  12/04/19  06:05    


 


Plt Count  168 10^3/uL (150-450)   12/04/19  06:05    


 


Lymph % (Auto)  6.9 % (13-45)  L  12/04/19  06:05    


 


Mono % (Auto)  6.2 % (3-13)   12/04/19  06:05    


 


Eos % (Auto)  0.0 % (0-6)   12/04/19  06:05    


 


Baso % (Auto)  0.1 % (0-2)   12/04/19  06:05    


 


Absolute Neuts (auto)  8.8 10^3/uL (1.7-8.2)  H  12/04/19  06:05    


 


Absolute Lymphs (auto)  0.7 10^3/uL (0.5-4.7)   12/04/19  06:05    


 


Absolute Monos (auto)  0.6 10^3/uL (0.1-1.4)   12/04/19  06:05    


 


Absolute Eos (auto)  0.0 10^3/uL (0.0-0.6)   12/04/19  06:05    


 


Absolute Basos (auto)  0.0 10^3/uL (0.0-0.2)   12/04/19  06:05    


 


Seg Neutrophils %  86.8 % (42-78)  H  12/04/19  06:05    


 


Sodium  136.5 mmol/L (137-145)  L  12/04/19  06:05    


 


Potassium  3.9 mmol/L (3.6-5.0)   12/04/19  06:05    


 


Chloride  104 mmol/L ()   12/04/19  06:05    


 


Carbon Dioxide  27 mmol/L (22-30)   12/04/19  06:05    


 


Anion Gap  6  (5-19)   12/04/19  06:05    


 


BUN  15 mg/dL (7-20)   12/04/19  06:05    


 


Creatinine  0.51 mg/dL (0.52-1.25)  L  12/04/19  06:05    


 


Est GFR ( Amer)  > 60  (>60)   12/04/19  06:05    


 


Est GFR (MDRD) Non-Af  > 60  (>60)   12/04/19  06:05    


 


Glucose  121 mg/dL ()  H  12/04/19  06:05    


 


Calcium  8.4 mg/dL (8.4-10.2)   12/04/19  06:05    


 


Lipase  50.3 U/L ()   12/04/19  06:05    


 


Blood Type  A POSITIVE   12/03/19  07:55    


 


Antibody Screen  NEGATIVE   12/03/19  07:55

## 2020-04-02 ENCOUNTER — HOSPITAL ENCOUNTER (INPATIENT)
Dept: HOSPITAL 62 - ER | Age: 80
LOS: 5 days | Discharge: HOME | DRG: 327 | End: 2020-04-07
Attending: SURGERY | Admitting: SURGERY
Payer: MEDICARE

## 2020-04-02 DIAGNOSIS — R53.1: ICD-10-CM

## 2020-04-02 DIAGNOSIS — K46.0: ICD-10-CM

## 2020-04-02 DIAGNOSIS — K31.1: Primary | ICD-10-CM

## 2020-04-02 DIAGNOSIS — Z90.3: ICD-10-CM

## 2020-04-02 DIAGNOSIS — I10: ICD-10-CM

## 2020-04-02 LAB
ADD MANUAL DIFF: NO
ALBUMIN SERPL-MCNC: 4.7 G/DL (ref 3.5–5)
ALP SERPL-CCNC: 82 U/L (ref 38–126)
ANION GAP SERPL CALC-SCNC: 15 MMOL/L (ref 5–19)
AST SERPL-CCNC: 28 U/L (ref 14–36)
BASOPHILS # BLD AUTO: 0 10^3/UL (ref 0–0.2)
BASOPHILS NFR BLD AUTO: 0.5 % (ref 0–2)
BILIRUB DIRECT SERPL-MCNC: 0.2 MG/DL (ref 0–0.4)
BILIRUB SERPL-MCNC: 0.9 MG/DL (ref 0.2–1.3)
BUN SERPL-MCNC: 35 MG/DL (ref 7–20)
CALCIUM: 10.4 MG/DL (ref 8.4–10.2)
CHLORIDE SERPL-SCNC: 103 MMOL/L (ref 98–107)
CO2 SERPL-SCNC: 25 MMOL/L (ref 22–30)
EOSINOPHIL # BLD AUTO: 0 10^3/UL (ref 0–0.6)
EOSINOPHIL NFR BLD AUTO: 0.1 % (ref 0–6)
ERYTHROCYTE [DISTWIDTH] IN BLOOD BY AUTOMATED COUNT: 20 % (ref 11.5–14)
GLUCOSE SERPL-MCNC: 146 MG/DL (ref 75–110)
HCT VFR BLD CALC: 37.6 % (ref 36–47)
HGB BLD-MCNC: 12.1 G/DL (ref 12–15.5)
LYMPHOCYTES # BLD AUTO: 1.2 10^3/UL (ref 0.5–4.7)
LYMPHOCYTES NFR BLD AUTO: 11.9 % (ref 13–45)
MCH RBC QN AUTO: 23.8 PG (ref 27–33.4)
MCHC RBC AUTO-ENTMCNC: 32.3 G/DL (ref 32–36)
MCV RBC AUTO: 74 FL (ref 80–97)
MONOCYTES # BLD AUTO: 0.7 10^3/UL (ref 0.1–1.4)
MONOCYTES NFR BLD AUTO: 6.7 % (ref 3–13)
NEUTROPHILS # BLD AUTO: 8 10^3/UL (ref 1.7–8.2)
NEUTS SEG NFR BLD AUTO: 80.8 % (ref 42–78)
PLATELET # BLD: 256 10^3/UL (ref 150–450)
POTASSIUM SERPL-SCNC: 4 MMOL/L (ref 3.6–5)
PROT SERPL-MCNC: 7.8 G/DL (ref 6.3–8.2)
RBC # BLD AUTO: 5.1 10^6/UL (ref 3.72–5.28)
TOTAL CELLS COUNTED % (AUTO): 100 %
WBC # BLD AUTO: 9.8 10^3/UL (ref 4–10.5)

## 2020-04-02 PROCEDURE — 74018 RADEX ABDOMEN 1 VIEW: CPT

## 2020-04-02 PROCEDURE — 80048 BASIC METABOLIC PNL TOTAL CA: CPT

## 2020-04-02 PROCEDURE — 43249 ESOPH EGD DILATION <30 MM: CPT

## 2020-04-02 PROCEDURE — 94799 UNLISTED PULMONARY SVC/PX: CPT

## 2020-04-02 PROCEDURE — 36415 COLL VENOUS BLD VENIPUNCTURE: CPT

## 2020-04-02 PROCEDURE — 96361 HYDRATE IV INFUSION ADD-ON: CPT

## 2020-04-02 PROCEDURE — 80076 HEPATIC FUNCTION PANEL: CPT

## 2020-04-02 PROCEDURE — 74177 CT ABD & PELVIS W/CONTRAST: CPT

## 2020-04-02 PROCEDURE — 0D9670Z DRAINAGE OF STOMACH WITH DRAINAGE DEVICE, VIA NATURAL OR ARTIFICIAL OPENING: ICD-10-PCS | Performed by: SURGERY

## 2020-04-02 PROCEDURE — C9290 INJ, BUPIVACAINE LIPOSOME: HCPCS

## 2020-04-02 PROCEDURE — 96374 THER/PROPH/DIAG INJ IV PUSH: CPT

## 2020-04-02 PROCEDURE — 83690 ASSAY OF LIPASE: CPT

## 2020-04-02 PROCEDURE — 93005 ELECTROCARDIOGRAM TRACING: CPT

## 2020-04-02 PROCEDURE — 93010 ELECTROCARDIOGRAM REPORT: CPT

## 2020-04-02 PROCEDURE — 85025 COMPLETE CBC W/AUTO DIFF WBC: CPT

## 2020-04-02 PROCEDURE — 99285 EMERGENCY DEPT VISIT HI MDM: CPT

## 2020-04-02 RX ADMIN — DEXTROSE, SODIUM CHLORIDE, AND POTASSIUM CHLORIDE PRN MLS/HR: 5; .45; .15 INJECTION INTRAVENOUS at 19:50

## 2020-04-02 NOTE — PDOC H&P
History of Present Illness


Admission Date/PCP: 


  





  ESTHER SWEET PA-C





Patient complains of: nausea vomiting


History of Present Illness: 


MICHAEL PAGE is a 79 year old female7 with a history of partial 

gastrectomy for a distal gastric mass and cholecystectomy in December, presents 

with generalized weakness nausea vomiting and decreased oral intake along with 

decreased stool output.  She was doing fine until Saturday when she developed 

nausea and vomiting and cannot tolerate liquids or solids.  She is taking Zofran

prescribed by her primary and it is not helping.  She is not had a bowel 

movement since Saturday and says that she has decreased gas as well and her 

stomach feels swollen.  Denies jaundice she denies fever.








Past Medical History


Cardiac Medical History: Reports: Hypertension


   Denies: Coronary Artery Disease, Myocardial Infarction


Pulmonary Medical History: 


   Denies: Asthma, Bronchitis, Chronic Obstructive Pulmonary Disease (COPD), 

Pneumonia


Neurological Medical History: 


   Denies: Seizures


GI Medical History: 


   Denies: Hepatitis, Hiatal Hernia


Musculoskeltal Medical History: Reports: Arthritis


Hematology: Reports: Anemia - HX OF, TOOK VIT B12 INJECTIONS


   Denies: Sickle Cell Disease





Past Surgical History


Past Surgical History: Reports: Hysterectomy


   Denies: Amputation, Mastectomy, Pacemaker





Social History


Smoking Status: Unknown if Ever Smoked





Family History


Family History: Reviewed & Not Pertinent


Parental Family History Reviewed: No


Children Family History Reviewed: NA


Sibling(s) Family History Reviewed.: NA





Medication/Allergy


Home Medications: 








Levothyroxine Sodium [Synthroid 50 Mcg Tablet] 50 mcg PO Q6AM 05/19/17 


Cetirizine HCl [Zyrtec 10 mg Tablet] 10 mg PO DAILY 10/09/19 


Diphenhydramine HCl [Benadryl 25 mg Capsule] 25 mg PO HSP PRN 10/09/19 


Calcium Carbonate/Vitamin D3 [Calcium 500 + Vit D Caplet] 1 tab PO DAILY 

11/22/19 


Telmisartan [Micardis 20 mg Tablet] 60 mg PO DAILY 12/04/19 








Allergies/Adverse Reactions: 


                                        





lisinopril Adverse Reaction (Verified 04/02/20 13:54)


   











Review of Systems


Constitutional: PRESENT: fatigue, weight loss


Eyes: ABSENT: as per HPI, visual disturbances, other


Ears: ABSENT: as per HPI, hearing changes, other


Nose, Mouth, and Throat: ABSENT: as per HPI, headache(s), mouth pain, sore 

throat, vertigo, other


Breasts: ABSENT: as per HPI, other


Cardiovascular: ABSENT: as per HPI, chest pain, dyspnea on exertion, edema, 

orthropnea, palpitations, other


Genitourinary: ABSENT: as per HPI, difficulty urinating, dysuria, hematuria, 

nocturia, other


Musculoskeletal: ABSENT: as per HPI, back pain, deformity, joint swelling, 

muscle weakness, other


Integumentary: ABSENT: as per HPI, diaphoresis, erythema, lesions, pruritus, 

rash, wounds, other


Neurological: ABSENT: as per HPI, abnormal gait, abnormal movements, abnormal 

speech, confusion, convulsions, dizziness, focal weakness, frequent falls, lack 

of coordination, memory loss, numbness, paresthesias, restless legs, syncope, 

tingling, tremor(s), vertigo, weakness, other


Psychiatric: ABSENT: as per HPI, anxiety, depression, hallucinations, homidical 

ideation, suicidal ideation, other


Endocrine: ABSENT: as per HPI, cold intolerance, flushing, heat intolerance, 

menstrual abnormalities, polydipsia, polyphagia, polyuria, other


Hematologic/Lymphatic: ABSENT: as per HPI, easy bleeding, easy bruising, 

lymphadenopathy, other


Allergic/Immunologic: ABSENT: as per HPI, seasonal rhinorrhea, other





Physical Exam


Vital Signs: 


                                        











Temp Pulse Resp BP Pulse Ox


 


 98.2 F      10 L  161/78 H  99 


 


 04/02/20 13:58     04/02/20 14:07  04/02/20 14:07  04/02/20 14:07








                                 Intake & Output











 04/01/20 04/02/20 04/03/20





 06:59 06:59 06:59


 


Intake Total   1000


 


Balance   1000


 


Weight   54.431 kg











General appearance: PRESENT: no acute distress


Head exam: PRESENT: normocephalic


Eye exam: PRESENT: EOMI


Ear exam: PRESENT: normal external ear exam


Mouth exam: PRESENT: moist


Neck exam: PRESENT: full ROM


Respiratory exam: PRESENT: clear to auscultation leyla


Cardiovascular exam: PRESENT: RRR


Pulses: PRESENT: normal radial pulses, normal femoral pulses


Vascular exam: PRESENT: normal capillary refill


Breast: PRESENT: Normal


GI/Abdominal exam: PRESENT: soft


Extremities exam: PRESENT: full ROM


Musculoskeletal exam: PRESENT: full ROM


Neurological exam: PRESENT: alert, awake, oriented to person, oriented to place


Psychiatric exam: PRESENT: appropriate affect


Skin exam: PRESENT: dry





Results


Laboratory Results: 


                                        





                                 04/02/20 14:10 





                                 04/02/20 14:10 





                                        











  04/02/20 04/02/20





  14:10 14:10


 


WBC  9.8 


 


RBC  5.10 


 


Hgb  12.1 


 


Hct  37.6 


 


MCV  74 L 


 


MCH  23.8 L 


 


MCHC  32.3 


 


RDW  20.0 H 


 


Plt Count  256 


 


Seg Neutrophils %  80.8 H 


 


Sodium   142.8


 


Potassium   4.0


 


Chloride   103


 


Carbon Dioxide   25


 


Anion Gap   15


 


BUN   35 H


 


Creatinine   0.69


 


Est GFR (African Amer)   > 60


 


Glucose   146 H


 


Calcium   10.4 H


 


Total Bilirubin   0.9


 


AST   28


 


Alkaline Phosphatase   82


 


Total Protein   7.8


 


Albumin   4.7


 


Lipase   244.5











Impressions: 


                                        





Abdomen/Pelvis CT  04/02/20 13:45


IMPRESSION:


1. Interval partial gastrectomy with marked distention and fluid retention in 

the residual stomach and distal esophagus suggestive of obstruction of the 

gastrojejunal anastomosis.  The distal small bowel is decompressed.


2. There is some inflammatory change and fluid involving loops of small bowel in

the anterior right abdomen which may represent acute enteritis or inflammatory 

change at the distal jejunojejunal anastomosis.  No evidence of peritoneal 

abscess or perforation.


3. Small pericardial effusion.


 














Assessment & Plan





- Diagnosis


(1) Gastric outlet obstruction


Is this a current diagnosis for this admission?: Yes   





- Plan Summary


Plan Summary: 





impression 


gastric outlet obstruction after a partial gastric resectin with 


angela y reconstruction.


plan 


ng tube placemtn


gastric decompression


pt then will need upper endoscopy with


possible anastomotic dilation.

## 2020-04-02 NOTE — RADIOLOGY REPORT (SQ)
EXAM DESCRIPTION:  KUB/ABDOMEN (SINGLE VIEW)



IMAGES COMPLETED DATE/TIME:  4/2/2020 5:08 pm



REASON FOR STUDY:  Check Placement of NG Tube



COMPARISON:  CT abdomen pelvis 4/2/2020



NUMBER OF VIEWS:  One view.



TECHNIQUE:   Supine radiographic image of the abdomen acquired.



LIMITATIONS:  Pelvis cropped from the field of view



FINDINGS:  BOWEL GAS PATTERN: A nasogastric tube is present with the tip and side-port in the stomach
.  Massively distended stomach.  Remainder of the bowel is decompressed.

CALCIFICATIONS: No suspicious calcifications.

SOFT TISSUES: No gross mass or suggestion of organomegaly.

HARDWARE: None in the abdomen.

BONES: No acute fracture. No worrisome bone lesions.

OTHER: IV contrast in nondilated renal collecting systems.



IMPRESSION:  Nasogastric tube tip and side port in the stomach



TECHNICAL DOCUMENTATION:  JOB ID:  [id number]

 2011 [employer]- All Rights Reserved



Reading location - IP/workstation name: [id number]

## 2020-04-02 NOTE — ER DOCUMENT REPORT
ED General





- General


Stated Complaint: GENERAL WEAKNESS


Time Seen by Provider: 04/02/20 13:44


Notes: 





79-year-old female with a history of cholecystectomy done in December which 

showed "some abnormal cells" that were not cancer, presents with generalized 

weakness nausea vomiting and decreased oral intake along with decreased stool 

output.  She was doing fine until Saturday when she developed nausea and 

vomiting and cannot tolerate liquids or solids.  She is taking Zofran prescribed

by her primary and it is not helping.  She is not had a bowel movement since 

Saturday and says that she has decreased gas as well and her stomach feels 

swollen.  Denies jaundice she denies fever.


TRAVEL OUTSIDE OF THE [place] IN LAST 30 DAYS: No





- Related Data


Allergies/Adverse Reactions: 


                                        





lisinopril Adverse Reaction (Verified 04/02/20 13:54)


   











Past Medical History





- Social History


Smoking Status: Unknown if Ever Smoked


Family History: Reviewed & Not Pertinent





- Past Medical History


Cardiac Medical History: Reports: Hx Hypertension


   Denies: Hx Coronary Artery Disease, Hx Heart Attack


Pulmonary Medical History: 


   Denies: Hx Asthma, Hx Bronchitis, Hx COPD, Hx Pneumonia


Neurological Medical History: Denies: Hx Cerebrovascular Accident, Hx Seizures


GI Medical History: Denies: Hx Hepatitis, Hx Hiatal Hernia, Hx Ulcer


Musculoskeletal Medical History: Reports Hx Arthritis


Infectious Medical History: Denies: Hx Hepatitis


Past Surgical History: Reports: Hx Hysterectomy.  Denies: Hx Mastectomy, Hx Open

Heart Surgery, Hx Pacemaker





- Immunizations


Hx Diphtheria, Pertussis, Tetanus Vaccination: No





Review of Systems





- Review of Systems


Notes: 





 REVIEW OF SYSTEMS





GEN: Denies fever, chills, weight loss


ENT: Denies sore throat, nasal discharge, ear pain


EYES: Denies blurry vision, eye pain, discharge


CV: Denies chest pain, palpitations, edema


RESP: Denies cough, shortness of breath, wheezing


GI: See HPI


MSK: Denies joint pain/swelling, edema, 


SKIN: Denies rash, skin lesions


LYMPH: Denies swollen glands/lymph nodes


NEURO: Denies headache, focal weakness or numbness, dizziness


PSYCH: Denies depression, suicidal or homicidal ideation








PHYSICAL EXAMINATION





General: No acute distress, well-nourished


Head: Atraumatic, normocephalic


ENT: Mouth normal, oropharynx moist, no exudates or tonsillar enlargement


Eyes: Conjunctiva normal, pupils equal, lids normal


Neck: No JVD, supple, no guarding


CVS: Normal rate, regular rhythm, no murmurs


Resp: No resp distress, equal and normal breath sounds bilaterally


GI: Mild distention no tympany decreased bowel sounds and tenderness in the 

right mid area


Ext: No deformities, no edema, normal range of motion in upper and lower ext


Back: No CVA or midline TTP


Skin: No rash, warm


Lymphatic: No lymphadeopathy noted


Neuro: Awake, alert.  Face symmetric.  GCS 15.





Physical Exam





- Vital signs


Vitals: 


                                        











Temp Resp Pulse Ox


 


 98.2 F   15   96 


 


 04/02/20 13:58  04/02/20 13:58  04/02/20 13:58














Course





- Re-evaluation


Re-evalutation: 





04/02/20 17:26


Patient presents abdominal pain after surgery was significant for obstruction


Meds fluids n.p.o. imaging


Imaging shows gastric outlet obstruction with severe gastric dilation.


Discussed the patient with Dr. Wahl who came to the ED to evaluate will 

place NG tube and he will admit.





- Vital Signs


Vital signs: 


                                        











Temp Pulse Resp BP Pulse Ox


 


 98.2 F      21 H  161/78 H  96 


 


 04/02/20 13:58     04/02/20 17:00  04/02/20 14:07  04/02/20 17:00














- Laboratory


Result Diagrams: 


                                 04/02/20 14:10





                                 04/02/20 14:10


Laboratory results interpreted by me: 


                                        











  04/02/20 04/02/20





  14:10 14:10


 


MCV  74 L 


 


MCH  23.8 L 


 


RDW  20.0 H 


 


Lymph % (Auto)  11.9 L 


 


Seg Neutrophils %  80.8 H 


 


BUN   35 H


 


Glucose   146 H


 


Calcium   10.4 H














Discharge





- Discharge


Clinical Impression: 


 Gastric outlet obstruction





Condition: Fair


Disposition: ADMITTED AS INPATIENT


Admitting Provider: Surgicalist


Unit Admitted: Surgical Floor

## 2020-04-02 NOTE — RADIOLOGY REPORT (SQ)
EXAM DESCRIPTION:  CT ABD/PELVIS WITH IV ONLY



IMAGES COMPLETED DATE/TIME:  4/2/2020 2:53 pm



REASON FOR STUDY:  SBO.  Upper abdominal pain, right upper quadrant and left upper quadrant pain.  Pr
ior hysterectomy, cholecystectomy, patient reports a gastrectomy for mass in stomach.



COMPARISON:  CT abdomen and pelvis, 10/30/2019.



TECHNIQUE:  CT scan of the abdomen and pelvis performed using helical scanning technique with dynamic
 intravenous contrast injection.  No oral contrast. Images reviewed with lung, soft tissue, and bone 
windows. Reconstructed coronal and sagittal MPR images reviewed. Delayed images for evaluation of the
 urinary system also acquired. All images stored on PACS.

All CT scanners at this facility use dose modulation, iterative reconstruction, and/or weight based d
osing when appropriate to reduce radiation dose to as low as reasonably achievable (ALARA).

[facility]: Dose Right  [facility]: CareDose    [facility]: Dose Right    [facility]: Teradose 4D    [facility]: Smart Technologies



CONTRAST TYPE AND DOSE:  contrast/concentration: Isovue 350.00 mg/ml; Total Contrast Delivered: 67.0 
ml; Total Saline Delivered: 56.0 ml



RENAL FUNCTION:  GFR > 60.



RADIATION DOSE:  CT Rad equipment meets quality standard of care and radiation dose reduction techniq
ues were employed. CTDIvol: 4.6 - 4.6 mGy. DLP: 504 mGy-cm..



LIMITATIONS:  None.



FINDINGS:  LOWER CHEST: Atelectasis in the lingula.  Small pericardial effusion.  Distended fluid-james
led esophagus.

LIVER: Normal size and contour.  No focal hepatic mass.  Hepatic and portal veins are patent.  No leyla
iary ductal dilation.

SPLEEN: Normal size. No focal lesions.

PANCREAS: No masses. No significant calcifications. No adjacent inflammation or peripancreatic fluid 
collections. Pancreatic duct not dilated.

GALLBLADDER: Post cholecystectomy since previous examination.

ADRENAL GLANDS: No significant masses or asymmetry.

RIGHT KIDNEY AND URETER: No solid masses.   No significant calcifications.   No hydronephrosis or hyd
roureter.

LEFT KIDNEY AND URETER: No solid masses.   Punctate nonobstructing left renal calculus.  No obstructi
ng renal or ureteral calculi.   No hydronephrosis or hydroureter.

AORTA AND VESSELS: No aneurysm. No dissection. Renal arteries, SMA, celiac without stenosis.

RETROPERITONEUM: No retroperitoneal adenopathy, hemorrhage or masses.

BOWEL AND PERITONEAL CAVITY: Markedly distended stomach with surgical anastomosis probably representi
ng a distal gastric resection with gastro jejunal anastomosis.  The small bowel is decompressed throu
ghout its course distal to the anastomosis.  There also appears to be a jejunojejunal anastomosis in 
the mid abdomen, relatively decompressed.  Mild surrounding inflammatory change may represent expecte
d postoperative change versus infectious or inflammatory process such as enteritis.  There does not a
ppear to be swirling of the vessels to suggest an internal hernia.  No pneumatosis intestinalis.  The
 colon is relatively decompressed.  Extensive sigmoid diverticulosis without evidence of diverticulit
is.  Trace ascites in a and pericolic gutters.  No pneumoperitoneum.

APPENDIX: Normal.

PELVIS: Urinary bladder has normal contour.  Calcified pelvic phleboliths.  The knee.

ABDOMINAL WALL: No masses. No hernias.

BONES: No significant or acute findings.

OTHER: No other significant finding.



IMPRESSION:

1. Interval partial gastrectomy with marked distention and fluid retention in the residual stomach an
d distal esophagus suggestive of obstruction of the gastrojejunal anastomosis.  The distal small brian
l is decompressed.

2. There is some inflammatory change and fluid involving loops of small bowel in the anterior right a
bdomen which may represent acute enteritis or inflammatory change at the distal jejunojejunal anastom
osis.  No evidence of peritoneal abscess or perforation.

3. Small pericardial effusion.



TECHNICAL DOCUMENTATION:  JOB ID:  [id number]

Quality ID # 436: Final reports with documentation of one or more dose reduction techniques (e.g., Au
tomated exposure control, adjustment of the mA and/or kV according to patient size, use of iterative 
reconstruction technique)

 2011 [employer]- All Rights Reserved



Reading location - IP/workstation name: [id number]

## 2020-04-03 LAB
ADD MANUAL DIFF: NO
ANION GAP SERPL CALC-SCNC: 4 MMOL/L (ref 5–19)
BASOPHILS # BLD AUTO: 0 10^3/UL (ref 0–0.2)
BASOPHILS NFR BLD AUTO: 0.5 % (ref 0–2)
BUN SERPL-MCNC: 25 MG/DL (ref 7–20)
CALCIUM: 8.9 MG/DL (ref 8.4–10.2)
CHLORIDE SERPL-SCNC: 107 MMOL/L (ref 98–107)
CO2 SERPL-SCNC: 30 MMOL/L (ref 22–30)
EOSINOPHIL # BLD AUTO: 0.1 10^3/UL (ref 0–0.6)
EOSINOPHIL NFR BLD AUTO: 2.1 % (ref 0–6)
ERYTHROCYTE [DISTWIDTH] IN BLOOD BY AUTOMATED COUNT: 19.7 % (ref 11.5–14)
GLUCOSE SERPL-MCNC: 118 MG/DL (ref 75–110)
HCT VFR BLD CALC: 31.4 % (ref 36–47)
HGB BLD-MCNC: 10.3 G/DL (ref 12–15.5)
LYMPHOCYTES # BLD AUTO: 1.4 10^3/UL (ref 0.5–4.7)
LYMPHOCYTES NFR BLD AUTO: 23.2 % (ref 13–45)
MCH RBC QN AUTO: 24.2 PG (ref 27–33.4)
MCHC RBC AUTO-ENTMCNC: 32.7 G/DL (ref 32–36)
MCV RBC AUTO: 74 FL (ref 80–97)
MONOCYTES # BLD AUTO: 0.8 10^3/UL (ref 0.1–1.4)
MONOCYTES NFR BLD AUTO: 12.7 % (ref 3–13)
NEUTROPHILS # BLD AUTO: 3.8 10^3/UL (ref 1.7–8.2)
NEUTS SEG NFR BLD AUTO: 61.5 % (ref 42–78)
PLATELET # BLD: 177 10^3/UL (ref 150–450)
POTASSIUM SERPL-SCNC: 3.6 MMOL/L (ref 3.6–5)
RBC # BLD AUTO: 4.24 10^6/UL (ref 3.72–5.28)
TOTAL CELLS COUNTED % (AUTO): 100 %
WBC # BLD AUTO: 6.1 10^3/UL (ref 4–10.5)

## 2020-04-03 RX ADMIN — DEXTROSE, SODIUM CHLORIDE, AND POTASSIUM CHLORIDE PRN MLS/HR: 5; .45; .15 INJECTION INTRAVENOUS at 16:46

## 2020-04-03 RX ADMIN — Medication PRN EACH: at 18:47

## 2020-04-03 RX ADMIN — DEXTROSE, SODIUM CHLORIDE, AND POTASSIUM CHLORIDE PRN MLS/HR: 5; .45; .15 INJECTION INTRAVENOUS at 05:46

## 2020-04-03 RX ADMIN — Medication PRN EACH: at 16:46

## 2020-04-03 RX ADMIN — MORPHINE SULFATE PRN MG: 10 INJECTION INTRAMUSCULAR; INTRAVENOUS; SUBCUTANEOUS at 22:42

## 2020-04-03 RX ADMIN — Medication PRN EACH: at 22:43

## 2020-04-03 NOTE — EKG REPORT
SEVERITY:- ABNORMAL ECG -

SINUS RHYTHM

NONSPECIFIC T ABNORMALITIES, LATERAL LEADS

:

Confirmed by: Kevin Chen 03-Apr-2020 16:19:29

## 2020-04-03 NOTE — PDOC PROGRESS REPORT
Subjective


Progress Note for:: 04/03/20


Subjective:: 





feels better with ng. 


will plan on upper endoscopy possible revision of 


gastrojejunostomy


Reason For Visit: 


GASTRIC OUTLET OBSTRUCTION








Physical Exam


Vital Signs: 


                                        











Temp Pulse Resp BP Pulse Ox


 


 97.6 F   63   16   148/65 H  94 


 


 04/03/20 07:17  04/03/20 07:17  04/03/20 07:17  04/03/20 07:17  04/03/20 07:17








                                 Intake & Output











 04/02/20 04/03/20 04/04/20





 06:59 06:59 06:59


 


Intake Total  1993 


 


Output Total  2950 


 


Balance  -957 


 


Weight  77.5 kg 














Results


Laboratory Results: 


                                        





                                 04/03/20 06:27 





                                 04/03/20 06:27 





                                        











  04/02/20 04/02/20 04/03/20





  14:10 14:10 06:27


 


WBC  9.8   6.1


 


RBC  5.10   4.24


 


Hgb  12.1   10.3 L


 


Hct  37.6   31.4 L


 


MCV  74 L   74 L


 


MCH  23.8 L   24.2 L


 


MCHC  32.3   32.7


 


RDW  20.0 H   19.7 H


 


Plt Count  256   177


 


Seg Neutrophils %  80.8 H   61.5


 


Sodium   142.8 


 


Potassium   4.0 


 


Chloride   103 


 


Carbon Dioxide   25 


 


Anion Gap   15 


 


BUN   35 H 


 


Creatinine   0.69 


 


Est GFR ( Amer)   > 60 


 


Glucose   146 H 


 


Calcium   10.4 H 


 


Total Bilirubin   0.9 


 


AST   28 


 


Alkaline Phosphatase   82 


 


Total Protein   7.8 


 


Albumin   4.7 


 


Lipase   244.5 














  04/03/20





  06:27


 


WBC 


 


RBC 


 


Hgb 


 


Hct 


 


MCV 


 


MCH 


 


MCHC 


 


RDW 


 


Plt Count 


 


Seg Neutrophils % 


 


Sodium  141.0


 


Potassium  3.6


 


Chloride  107


 


Carbon Dioxide  30


 


Anion Gap  4 L


 


BUN  25 H


 


Creatinine  0.50 L


 


Est GFR (African Amer)  > 60


 


Glucose  118 H


 


Calcium  8.9


 


Total Bilirubin 


 


AST 


 


Alkaline Phosphatase 


 


Total Protein 


 


Albumin 


 


Lipase 











Impressions: 


                                        





Abdomen/Pelvis CT  04/02/20 13:45


IMPRESSION:


1. Interval partial gastrectomy with marked distention and fluid retention in 

the residual stomach and distal esophagus suggestive of obstruction of the 

gastrojejunal anastomosis.  The distal small bowel is decompressed.


2. There is some inflammatory change and fluid involving loops of small bowel in

the anterior right abdomen which may represent acute enteritis or inflammatory 

change at the distal jejunojejunal anastomosis.  No evidence of peritoneal 

abscess or perforation.


3. Small pericardial effusion.


 








KUB X-Ray  04/02/20 16:40


IMPRESSION:  Nasogastric tube tip and side port in the stomach


 














Assessment & Plan





- Diagnosis


(1) Gastric outlet obstruction


Is this a current diagnosis for this admission?: Yes

## 2020-04-04 PROCEDURE — 0DQV0ZZ REPAIR MESENTERY, OPEN APPROACH: ICD-10-PCS | Performed by: SURGERY

## 2020-04-04 PROCEDURE — 0DBA0ZZ EXCISION OF JEJUNUM, OPEN APPROACH: ICD-10-PCS | Performed by: SURGERY

## 2020-04-04 PROCEDURE — 0DJ08ZZ INSPECTION OF UPPER INTESTINAL TRACT, VIA NATURAL OR ARTIFICIAL OPENING ENDOSCOPIC: ICD-10-PCS | Performed by: SURGERY

## 2020-04-04 PROCEDURE — 0D160ZA BYPASS STOMACH TO JEJUNUM, OPEN APPROACH: ICD-10-PCS | Performed by: SURGERY

## 2020-04-04 RX ADMIN — MORPHINE SULFATE PRN MG: 10 INJECTION INTRAMUSCULAR; INTRAVENOUS; SUBCUTANEOUS at 21:38

## 2020-04-04 RX ADMIN — DEXTROSE, SODIUM CHLORIDE, AND POTASSIUM CHLORIDE PRN MLS/HR: 5; .45; .15 INJECTION INTRAVENOUS at 17:21

## 2020-04-04 RX ADMIN — DEXTROSE, SODIUM CHLORIDE, AND POTASSIUM CHLORIDE PRN MLS/HR: 5; .45; .15 INJECTION INTRAVENOUS at 23:46

## 2020-04-04 RX ADMIN — MORPHINE SULFATE PRN MG: 10 INJECTION INTRAMUSCULAR; INTRAVENOUS; SUBCUTANEOUS at 17:24

## 2020-04-04 RX ADMIN — HEPARIN SODIUM SCH UNIT: 5000 INJECTION, SOLUTION INTRAVENOUS; SUBCUTANEOUS at 21:40

## 2020-04-04 RX ADMIN — HEPARIN SODIUM SCH: 5000 INJECTION, SOLUTION INTRAVENOUS; SUBCUTANEOUS at 14:40

## 2020-04-04 RX ADMIN — DEXTROSE, SODIUM CHLORIDE, AND POTASSIUM CHLORIDE PRN MLS/HR: 5; .45; .15 INJECTION INTRAVENOUS at 04:47

## 2020-04-04 NOTE — RADIOLOGY REPORT (SQ)
EXAM DESCRIPTION:  KUB/ABDOMEN (SINGLE VIEW)



IMAGES COMPLETED DATE/TIME:  4/4/2020 2:58 pm



REASON FOR STUDY:  Check Placement of NG Tube



COMPARISON:  KUB 4/20/2020



NUMBER OF VIEWS:  One view.



TECHNIQUE:   Supine radiographic image of the abdomen acquired for nasogastric tube placement.



LIMITATIONS:  Pelvis not visualized



FINDINGS:  Nasogastric tube tip in the stomach fundus, side port at the GE junction.  There are surgi
sue staples in the mid epigastrium.  Stomach fundus decompressed.  No dilated small bowel loops or co
ana.



IMPRESSION:  Nasogastric tube tip in the stomach, side port at the GE junction



TECHNICAL DOCUMENTATION:  JOB ID:  [id number]

 2011 [employer]- All Rights Reserved



Reading location - IP/workstation name: [email or web address]

## 2020-04-05 LAB
ADD MANUAL DIFF: NO
ANION GAP SERPL CALC-SCNC: 5 MMOL/L (ref 5–19)
BASOPHILS # BLD AUTO: 0 10^3/UL (ref 0–0.2)
BASOPHILS NFR BLD AUTO: 0.3 % (ref 0–2)
BUN SERPL-MCNC: 12 MG/DL (ref 7–20)
CALCIUM: 7.4 MG/DL (ref 8.4–10.2)
CHLORIDE SERPL-SCNC: 110 MMOL/L (ref 98–107)
CO2 SERPL-SCNC: 24 MMOL/L (ref 22–30)
EOSINOPHIL # BLD AUTO: 0 10^3/UL (ref 0–0.6)
EOSINOPHIL NFR BLD AUTO: 0 % (ref 0–6)
ERYTHROCYTE [DISTWIDTH] IN BLOOD BY AUTOMATED COUNT: 20.3 % (ref 11.5–14)
GLUCOSE SERPL-MCNC: 151 MG/DL (ref 75–110)
HCT VFR BLD CALC: 31.4 % (ref 36–47)
HGB BLD-MCNC: 10 G/DL (ref 12–15.5)
LYMPHOCYTES # BLD AUTO: 0.9 10^3/UL (ref 0.5–4.7)
LYMPHOCYTES NFR BLD AUTO: 7.8 % (ref 13–45)
MCH RBC QN AUTO: 23.7 PG (ref 27–33.4)
MCHC RBC AUTO-ENTMCNC: 31.9 G/DL (ref 32–36)
MCV RBC AUTO: 74 FL (ref 80–97)
MONOCYTES # BLD AUTO: 1 10^3/UL (ref 0.1–1.4)
MONOCYTES NFR BLD AUTO: 8 % (ref 3–13)
NEUTROPHILS # BLD AUTO: 10.1 10^3/UL (ref 1.7–8.2)
NEUTS SEG NFR BLD AUTO: 83.9 % (ref 42–78)
PLATELET # BLD: 189 10^3/UL (ref 150–450)
POTASSIUM SERPL-SCNC: 4 MMOL/L (ref 3.6–5)
RBC # BLD AUTO: 4.22 10^6/UL (ref 3.72–5.28)
TOTAL CELLS COUNTED % (AUTO): 100 %
WBC # BLD AUTO: 12 10^3/UL (ref 4–10.5)

## 2020-04-05 RX ADMIN — MORPHINE SULFATE PRN MG: 10 INJECTION INTRAMUSCULAR; INTRAVENOUS; SUBCUTANEOUS at 21:26

## 2020-04-05 RX ADMIN — HEPARIN SODIUM SCH UNIT: 5000 INJECTION, SOLUTION INTRAVENOUS; SUBCUTANEOUS at 21:17

## 2020-04-05 RX ADMIN — MORPHINE SULFATE PRN MG: 10 INJECTION INTRAMUSCULAR; INTRAVENOUS; SUBCUTANEOUS at 10:55

## 2020-04-05 RX ADMIN — DEXTROSE, SODIUM CHLORIDE, AND POTASSIUM CHLORIDE PRN MLS/HR: 5; .45; .15 INJECTION INTRAVENOUS at 14:06

## 2020-04-05 RX ADMIN — DEXTROSE, SODIUM CHLORIDE, AND POTASSIUM CHLORIDE PRN MLS/HR: 5; .45; .15 INJECTION INTRAVENOUS at 21:17

## 2020-04-05 RX ADMIN — DEXTROSE, SODIUM CHLORIDE, AND POTASSIUM CHLORIDE PRN MLS/HR: 5; .45; .15 INJECTION INTRAVENOUS at 06:31

## 2020-04-05 RX ADMIN — MORPHINE SULFATE PRN MG: 10 INJECTION INTRAMUSCULAR; INTRAVENOUS; SUBCUTANEOUS at 01:42

## 2020-04-05 RX ADMIN — HEPARIN SODIUM SCH UNIT: 5000 INJECTION, SOLUTION INTRAVENOUS; SUBCUTANEOUS at 14:06

## 2020-04-05 RX ADMIN — MORPHINE SULFATE PRN MG: 10 INJECTION INTRAMUSCULAR; INTRAVENOUS; SUBCUTANEOUS at 06:31

## 2020-04-05 RX ADMIN — HEPARIN SODIUM SCH UNIT: 5000 INJECTION, SOLUTION INTRAVENOUS; SUBCUTANEOUS at 05:10

## 2020-04-05 NOTE — PDOC PROGRESS REPORT
Subjective


Progress Note for:: 04/05/20


Subjective:: 





feels ok this am


min if any pain


Reason For Visit: 


GASTRIC OUTLET OBSTRUCTION








Physical Exam


Vital Signs: 


                                        











Temp Pulse Resp BP Pulse Ox


 


 98.5 F   82   16   123/56 L  93 


 


 04/05/20 07:50  04/05/20 07:50  04/05/20 07:50  04/05/20 07:50  04/05/20 07:50








                                 Intake & Output











 04/04/20 04/05/20 04/06/20





 06:59 06:59 06:59


 


Intake Total 2000 8263 


 


Output Total 1070 2200 


 


Balance 930 6063 


 


Weight 58 kg 65 kg 











General appearance: PRESENT: no acute distress


Head exam: PRESENT: normocephalic


Eye exam: PRESENT: EOMI


Ear exam: PRESENT: normal external ear exam


Mouth exam: PRESENT: moist


Neck exam: PRESENT: full ROM


Respiratory exam: PRESENT: clear to auscultation leyla


Cardiovascular exam: PRESENT: RRR


Pulses: PRESENT: normal radial pulses, normal femoral pulses


Breast: PRESENT: Normal


GI/Abdominal exam: PRESENT: soft


Rectal exam: PRESENT: deferred


Extremities exam: PRESENT: full ROM


Musculoskeletal exam: PRESENT: full ROM


Neurological exam: PRESENT: alert, awake, oriented to person, oriented to place


Psychiatric exam: PRESENT: appropriate affect


Skin exam: PRESENT: dry





Results


Laboratory Results: 


                                        





                                 04/05/20 04:45 





                                 04/05/20 04:45 





                                        











  04/05/20 04/05/20





  04:45 04:45


 


WBC  12.0 H 


 


RBC  4.22 


 


Hgb  10.0 L 


 


Hct  31.4 L 


 


MCV  74 L 


 


MCH  23.7 L 


 


MCHC  31.9 L 


 


RDW  20.3 H 


 


Plt Count  189 


 


Seg Neutrophils %  83.9 H 


 


Sodium   138.5


 


Potassium   4.0


 


Chloride   110 H


 


Carbon Dioxide   24


 


Anion Gap   5


 


BUN   12


 


Creatinine   0.45 L


 


Est GFR (African Amer)   > 60


 


Glucose   151 H


 


Calcium   7.4 L











Impressions: 


                                        





Abdomen/Pelvis CT  04/02/20 13:45


IMPRESSION:


1. Interval partial gastrectomy with marked distention and fluid retention in 

the residual stomach and distal esophagus suggestive of obstruction of the moe

rojejunal anastomosis.  The distal small bowel is decompressed.


2. There is some inflammatory change and fluid involving loops of small bowel in

the anterior right abdomen which may represent acute enteritis or inflammatory 

change at the distal jejunojejunal anastomosis.  No evidence of peritoneal a

bscess or perforation.


3. Small pericardial effusion.


 








KUB X-Ray  04/04/20 11:49


IMPRESSION:  Nasogastric tube tip in the stomach, side port at the GE junction


 














Assessment & Plan





- Diagnosis


(1) Gastric outlet obstruction


Is this a current diagnosis for this admission?: Yes   





- Plan Summary


Plan Summary: 





s/p endoscopy and 


revision of gastrojejunostomy iwth reduction of internal hernia\


doing well this am\


ng will remain in place as will zavaleta


pt can have ice chips


increase activity

## 2020-04-05 NOTE — OPERATIVE REPORT
Nonrecallable Operative Report


DATE OF SURGERY: 04/04/20


PREOPERATIVE DIAGNOSIS: gastric outlet obstruction


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


OPERATION: esophagogastroscopy with attempted anastomotic dilation,.  diagnostic

laparoscopy with laparotomy and lysis of adhesions and.  revision of 

gastroenterostomy.


SURGEON: URIEL CARTER


ANESTHESIA: GA


TISSUE REMOVED OR ALTERED: none


COMPLICATIONS: 





none


ESTIMATED BLOOD LOSS: 25cc.


INTRAOPERATIVE FINDINGS: internal hernia causing a twist and ischemia of the 

proximal boogie limb at the gastroenterostomy


PROCEDURE: 





see note





Patient was brought to the negative pressure room and using code 19 precautions 

the procedure commenced.





After appropriate timeout site verification procedure commenced.





The Olympus gastroscope was passed into the posterior pharynx and visualization 

of the proximal esophagus revealed normal anatomy without mucosal abnormalities.

 Scope was then passed into the stomach there was a large amount of gastric 

content which required suctioning and irrigation for somewhat prolonged period 

of time.





Once we were able to visualize the stomach I identified the gastrojejunal 

anastomosis.





The anastomosis appeared to be quite narrowed and we made multiple attempts 

using the dilating balloon to dilate the anastomosis however I was really unable

to pass the dilating balloon past the anastomosis.  Therefore was unable to 

really get a dilation.





We therefore abandon this portion of the procedure and moved onto the operating 

room.





Using again covered precautions the patient was transferred to the operating 

room she was placed on the operating table in a supine position the abdomen was 

then prepped and draped and a Humphrey catheter was placed.  A varies needle was 

placed into the umbilicus and the abdomen was insufflated with 6 L of CO2 gas 

infraumbilical 10 mm incision was made with a 15 blade and a 10 mm port placed 

in the abdominal cavity intra-abdominal visualization revealed no evidence of 

Veress needle or trocar injury and epigastric 5 mm port placed under direct 

vision as well as 2 lateral ports a 5 and a 12 on the right side of the 

abdominal wall.





The stomach was markedly dilated I asked anesthesia to pass an NG tube back into

the stomach and we decompressed it.  I then noticed that the gastrojejunal 

anastomosis appeared to be somewhat ischemic and twisted it appeared that there 

is a number of loops of small bowel that were twisted underneath what appeared t

o be a thick adhesion.  After multiple attempts I was unable to reduce and 

therefore abandon the laparoscopic approach.





Midline incision was used from the xiphoid to about 1 handsbreadth above the um

bilicus dissection was carried out through subcutaneous tissue with Bovie 

cautery.  The midline fascia was entered with Bovie cautery the pneumoperitoneum

was reduced.  We immediately noticed that that the patient had a number of loops

of small bowel that were incarcerated and what appeared to be a Loya defect.

 I was able to reduce the twist.  Once we did that we were able to decompress 

the small bowel however the gastrojejunal anastomosis appeared to be somewhat 

ischemic.  It did not pink up as I would have hoped after reduction of the 

internal twist.





Therefore I came across the distal stomach proximal to the anastomosis with one 

firing the KENRICK stapler with a green load.  We then resected the small area of 

ischemic jejunum approximately 5 cm long.  This brought us back to normal-ky

earing serosa.





The Boogie limb was still approximately 40 cm and I felt comfortable performing a 

new gastrojejunostomy this was done in 2 layers poppy Chino Galvan being 2-0 

silk in the and running in a layer 3-0 Vicryl.





At the termination of this patient had a new gastrojejunostomy it was antecolic 

the Loya defect was closed with interrupted 2 oh silks attaching the 

transverse mesocolon as well as some of the transverse serosa to the jejunal 

limb





The abdomen was copiously irrigated normal saline suctioned dry the midline 

fascia was closed with interrupted #2 Vicryl sutures the skin was closed with 

standard skin clips.





Sponge and needle counts were correct x2





Estimated blood loss was less than 50 cc





The patient was then transferred back to the negative pressure room remaining 

intubated using a covert 19 precautions for extubation.

## 2020-04-06 LAB
ADD MANUAL DIFF: NO
ANION GAP SERPL CALC-SCNC: 2 MMOL/L (ref 5–19)
BASOPHILS # BLD AUTO: 0 10^3/UL (ref 0–0.2)
BASOPHILS NFR BLD AUTO: 0.4 % (ref 0–2)
BUN SERPL-MCNC: 6 MG/DL (ref 7–20)
CALCIUM: 7.5 MG/DL (ref 8.4–10.2)
CHLORIDE SERPL-SCNC: 110 MMOL/L (ref 98–107)
CO2 SERPL-SCNC: 24 MMOL/L (ref 22–30)
EOSINOPHIL # BLD AUTO: 0.1 10^3/UL (ref 0–0.6)
EOSINOPHIL NFR BLD AUTO: 1.5 % (ref 0–6)
ERYTHROCYTE [DISTWIDTH] IN BLOOD BY AUTOMATED COUNT: 20.9 % (ref 11.5–14)
GLUCOSE SERPL-MCNC: 114 MG/DL (ref 75–110)
HCT VFR BLD CALC: 27.4 % (ref 36–47)
HGB BLD-MCNC: 9 G/DL (ref 12–15.5)
LYMPHOCYTES # BLD AUTO: 1.1 10^3/UL (ref 0.5–4.7)
LYMPHOCYTES NFR BLD AUTO: 11.8 % (ref 13–45)
MCH RBC QN AUTO: 23.9 PG (ref 27–33.4)
MCHC RBC AUTO-ENTMCNC: 32.8 G/DL (ref 32–36)
MCV RBC AUTO: 73 FL (ref 80–97)
MONOCYTES # BLD AUTO: 0.8 10^3/UL (ref 0.1–1.4)
MONOCYTES NFR BLD AUTO: 8.3 % (ref 3–13)
NEUTROPHILS # BLD AUTO: 7.2 10^3/UL (ref 1.7–8.2)
NEUTS SEG NFR BLD AUTO: 78 % (ref 42–78)
PLATELET # BLD: 182 10^3/UL (ref 150–450)
POTASSIUM SERPL-SCNC: 3.2 MMOL/L (ref 3.6–5)
RBC # BLD AUTO: 3.76 10^6/UL (ref 3.72–5.28)
TOTAL CELLS COUNTED % (AUTO): 100 %
WBC # BLD AUTO: 9.2 10^3/UL (ref 4–10.5)

## 2020-04-06 RX ADMIN — MORPHINE SULFATE PRN MG: 10 INJECTION INTRAMUSCULAR; INTRAVENOUS; SUBCUTANEOUS at 22:20

## 2020-04-06 RX ADMIN — HEPARIN SODIUM SCH UNIT: 5000 INJECTION, SOLUTION INTRAVENOUS; SUBCUTANEOUS at 06:19

## 2020-04-06 RX ADMIN — DEXTROSE, SODIUM CHLORIDE, AND POTASSIUM CHLORIDE PRN MLS/HR: 5; .45; .15 INJECTION INTRAVENOUS at 03:45

## 2020-04-06 RX ADMIN — HEPARIN SODIUM SCH UNIT: 5000 INJECTION, SOLUTION INTRAVENOUS; SUBCUTANEOUS at 21:05

## 2020-04-06 RX ADMIN — MORPHINE SULFATE PRN MG: 10 INJECTION INTRAMUSCULAR; INTRAVENOUS; SUBCUTANEOUS at 16:20

## 2020-04-06 RX ADMIN — HEPARIN SODIUM SCH UNIT: 5000 INJECTION, SOLUTION INTRAVENOUS; SUBCUTANEOUS at 13:30

## 2020-04-06 NOTE — PDOC PROGRESS REPORT
Subjective


Progress Note for:: 04/06/20


Subjective:: 





feels better


passed some flatus


ng with decreased op


Reason For Visit: 


GASTRIC OUTLET OBSTRUCTION








Physical Exam


Vital Signs: 


                                        











Temp Pulse Resp BP Pulse Ox


 


 99.1 F   84   19   129/61 H  92 


 


 04/06/20 07:30  04/06/20 07:30  04/06/20 07:30  04/06/20 07:30  04/06/20 07:30








                                 Intake & Output











 04/05/20 04/06/20 04/07/20





 06:59 06:59 06:59


 


Intake Total 8263 3230 


 


Output Total 2200 975 


 


Balance 6063 2255 


 


Weight 65 kg 65.9 kg 











General appearance: PRESENT: no acute distress


Head exam: PRESENT: normocephalic


Eye exam: PRESENT: EOMI


Mouth exam: PRESENT: moist


Neck exam: PRESENT: full ROM


Respiratory exam: PRESENT: clear to auscultation leyla


Cardiovascular exam: PRESENT: RRR


Pulses: PRESENT: normal radial pulses, normal femoral pulses


Vascular exam: PRESENT: normal capillary refill


GI/Abdominal exam: PRESENT: soft


Rectal exam: PRESENT: deferred


Extremities exam: PRESENT: full ROM


Musculoskeletal exam: PRESENT: full ROM


Neurological exam: PRESENT: alert, awake, oriented to person, oriented to place


Psychiatric exam: PRESENT: appropriate affect


Skin exam: PRESENT: dry





Results


Laboratory Results: 


                                        





                                 04/06/20 08:00 





                                 04/06/20 08:00 





                                        











  04/06/20 04/06/20





  08:00 08:00


 


WBC  9.2 


 


RBC  3.76 


 


Hgb  9.0 L 


 


Hct  27.4 L 


 


MCV  73 L 


 


MCH  23.9 L 


 


MCHC  32.8 


 


RDW  20.9 H 


 


Plt Count  182 


 


Seg Neutrophils %  78.0 


 


Sodium   136.1 L


 


Potassium   3.2 L


 


Chloride   110 H


 


Carbon Dioxide   24


 


Anion Gap   2 L


 


BUN   6 L


 


Creatinine   0.42 L


 


Est GFR (African Amer)   > 60


 


Glucose   114 H


 


Calcium   7.5 L











Impressions: 


                                        





Abdomen/Pelvis CT  04/02/20 13:45


IMPRESSION:


1. Interval partial gastrectomy with marked distention and fluid retention in 

the residual stomach and distal esophagus suggestive of obstruction of the 

gastrojejunal anastomosis.  The distal small bowel is decompressed.


2. There is some inflammatory change and fluid involving loops of small bowel in

the anterior right abdomen which may represent acute enteritis or inflammatory 

change at the distal jejunojejunal anastomosis.  No evidence of peritoneal 

abscess or perforation.


3. Small pericardial effusion.


 








KUB X-Ray  04/04/20 11:49


IMPRESSION:  Nasogastric tube tip in the stomach, side port at the GE junction


 














Assessment & Plan





- Diagnosis


(1) Gastric outlet obstruction


Is this a current diagnosis for this admission?: Yes   





- Plan Summary


Plan Summary: 





s/p revision of gastroenterostomy


doing better


will dc n g and zavaleta today


start clear liquids

## 2020-04-07 VITALS — DIASTOLIC BLOOD PRESSURE: 60 MMHG | SYSTOLIC BLOOD PRESSURE: 127 MMHG

## 2020-04-07 LAB
ADD MANUAL DIFF: NO
ANION GAP SERPL CALC-SCNC: 7 MMOL/L (ref 5–19)
BASOPHILS # BLD AUTO: 0 10^3/UL (ref 0–0.2)
BASOPHILS NFR BLD AUTO: 1 % (ref 0–2)
BUN SERPL-MCNC: 5 MG/DL (ref 7–20)
CALCIUM: 8.1 MG/DL (ref 8.4–10.2)
CHLORIDE SERPL-SCNC: 107 MMOL/L (ref 98–107)
CO2 SERPL-SCNC: 26 MMOL/L (ref 22–30)
EOSINOPHIL # BLD AUTO: 0.1 10^3/UL (ref 0–0.6)
EOSINOPHIL NFR BLD AUTO: 3 % (ref 0–6)
ERYTHROCYTE [DISTWIDTH] IN BLOOD BY AUTOMATED COUNT: 21 % (ref 11.5–14)
GLUCOSE SERPL-MCNC: 90 MG/DL (ref 75–110)
HCT VFR BLD CALC: 29.5 % (ref 36–47)
HGB BLD-MCNC: 9.7 G/DL (ref 12–15.5)
LYMPHOCYTES # BLD AUTO: 1.4 10^3/UL (ref 0.5–4.7)
LYMPHOCYTES NFR BLD AUTO: 28.8 % (ref 13–45)
MCH RBC QN AUTO: 24.4 PG (ref 27–33.4)
MCHC RBC AUTO-ENTMCNC: 33 G/DL (ref 32–36)
MCV RBC AUTO: 74 FL (ref 80–97)
MONOCYTES # BLD AUTO: 0.4 10^3/UL (ref 0.1–1.4)
MONOCYTES NFR BLD AUTO: 7.8 % (ref 3–13)
NEUTROPHILS # BLD AUTO: 2.9 10^3/UL (ref 1.7–8.2)
NEUTS SEG NFR BLD AUTO: 59.4 % (ref 42–78)
PLATELET # BLD: 236 10^3/UL (ref 150–450)
POTASSIUM SERPL-SCNC: 3.2 MMOL/L (ref 3.6–5)
RBC # BLD AUTO: 3.98 10^6/UL (ref 3.72–5.28)
TOTAL CELLS COUNTED % (AUTO): 100 %
WBC # BLD AUTO: 4.9 10^3/UL (ref 4–10.5)

## 2020-04-07 RX ADMIN — DEXTROSE, SODIUM CHLORIDE, AND POTASSIUM CHLORIDE PRN MLS/HR: 5; .45; .15 INJECTION INTRAVENOUS at 01:34

## 2020-04-07 RX ADMIN — HEPARIN SODIUM SCH UNIT: 5000 INJECTION, SOLUTION INTRAVENOUS; SUBCUTANEOUS at 05:17

## 2020-04-07 NOTE — PDOC DISCHARGE SUMMARY
General





- Admit/Disc Date/PCP


Admission Date/Primary Care Provider: 


  04/02/20 17:15





  ESTHER SWEET PA-C





Discharge Date: 04/07/20





- Discharge Diagnosis


Final Diagnosis: 





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





- Assessment


Summary: 


pt was admitted iwth gastric outlet  obstruction s/p partial gastrectiomy with 

angela y reconstruction 4 mos ago


ng tube placed and pt taken to or on hospital day 2 for endoscopy and poss 

dilation


reqired a open revison of the gastroenterostomy due to ischemia due to internal 

loya hernia


pt chito procedkure well and


post op h;ad a routine course


ng was removed and pt started on liquids which she tolerated


pt now having nl bowel function and ready for dc home





- Additional Information


Resuscitation Status: Full Code


Discharge Diet: As Tolerated, Other (Comments) - avoid raw vegtables, and thick 

meats,


Discharge Activity: No Lifting Over 10 Pounds


Referrals: 


URIEL CARTER MD [ACTIVE STAFF] - 04/15/20 8:15 am


Home Medications: 








Levothyroxine Sodium [Synthroid 0.05 mg Tablet] 0.05 mg PO Q6AM 04/02/20 


Telmisartan 40 mg PO DAILY 04/02/20 











History of Present Illiness


History of Present Illness: 


MICHAEL PAGE is a 79 year old female7 with a history of partial 

gastrectomy for a distal gastric mass and cholecystectomy in December, presents 

with generalized weakness nausea vomiting and decreased oral intake along with 

decreased stool output.  She was doing fine until Saturday when she developed 

nausea and vomiting and cannot tolerate liquids or solids.  She is taking Zofran

prescribed by her primary and it is not helping.  She is not had a bowel 

movement since Saturday and says that she has decreased gas as well and her 

stomach feels swollen.  Denies jaundice she denies fever.








Physical Exam


Vital Signs: 


                                        











Temp Pulse Resp BP Pulse Ox


 


 98.8 F   72   22 H  161/64 H  100 


 


 04/07/20 07:28  04/07/20 07:28  04/07/20 07:28  04/07/20 07:28  04/07/20 07:28








                                 Intake & Output











 04/06/20 04/07/20 04/08/20





 06:59 06:59 06:59


 


Intake Total 3230 1880 


 


Output Total 975  


 


Balance 2255 1880 


 


Weight 65.9 kg 64.3 kg 














Results


Laboratory Results: 


                                        











WBC  4.9 10^3/uL (4.0-10.5)   04/07/20  07:45    


 


RBC  3.98 10^6/uL (3.72-5.28)   04/07/20  07:45    


 


Hgb  9.7 g/dL (12.0-15.5)  L  04/07/20  07:45    


 


Hct  29.5 % (36.0-47.0)  L  04/07/20  07:45    


 


MCV  74 fl (80-97)  L  04/07/20  07:45    


 


MCH  24.4 pg (27.0-33.4)  L  04/07/20  07:45    


 


MCHC  33.0 g/dL (32.0-36.0)   04/07/20  07:45    


 


RDW  21.0 % (11.5-14.0)  H  04/07/20  07:45    


 


Plt Count  236 10^3/uL (150-450)   04/07/20  07:45    


 


Lymph % (Auto)  28.8 % (13-45)   04/07/20  07:45    


 


Mono % (Auto)  7.8 % (3-13)   04/07/20  07:45    


 


Eos % (Auto)  3.0 % (0-6)   04/07/20  07:45    


 


Baso % (Auto)  1.0 % (0-2)   04/07/20  07:45    


 


Absolute Neuts (auto)  2.9 10^3/uL (1.7-8.2)   04/07/20  07:45    


 


Absolute Lymphs (auto)  1.4 10^3/uL (0.5-4.7)   04/07/20  07:45    


 


Absolute Monos (auto)  0.4 10^3/uL (0.1-1.4)   04/07/20  07:45    


 


Absolute Eos (auto)  0.1 10^3/uL (0.0-0.6)   04/07/20  07:45    


 


Absolute Basos (auto)  0.0 10^3/uL (0.0-0.2)   04/07/20  07:45    


 


Seg Neutrophils %  59.4 % (42-78)   04/07/20  07:45    


 


Sodium  139.5 mmol/L (137-145)   04/07/20  07:45    


 


Potassium  3.2 mmol/L (3.6-5.0)  L  04/07/20  07:45    


 


Chloride  107 mmol/L ()   04/07/20  07:45    


 


Carbon Dioxide  26 mmol/L (22-30)   04/07/20  07:45    


 


Anion Gap  7  (5-19)   04/07/20  07:45    


 


BUN  5 mg/dL (7-20)  L  04/07/20  07:45    


 


Creatinine  0.39 mg/dL (0.52-1.25)  L  04/07/20  07:45    


 


Est GFR ( Amer)  > 60  (>60)   04/07/20  07:45    


 


Est GFR (MDRD) Non-Af  > 60  (>60)   04/07/20  07:45    


 


Glucose  90 mg/dL ()   04/07/20  07:45    


 


Calcium  8.1 mg/dL (8.4-10.2)  L  04/07/20  07:45    


 


Total Bilirubin  0.9 mg/dL (0.2-1.3)   04/02/20  14:10    


 


Direct Bilirubin  0.2 mg/dL (0.0-0.4)   04/02/20  14:10    


 


Neonat Total Bilirubin  Not Reportable   04/02/20  14:10    


 


Neonat Direct Bilirubin  Not Reportable   04/02/20  14:10    


 


Neonat Indirect Bili  Not Reportable   04/02/20  14:10    


 


AST  28 U/L (14-36)   04/02/20  14:10    


 


ALT  19 U/L (<35)   04/02/20  14:10    


 


Alkaline Phosphatase  82 U/L ()   04/02/20  14:10    


 


Total Protein  7.8 g/dL (6.3-8.2)   04/02/20  14:10    


 


Albumin  4.7 g/dL (3.5-5.0)   04/02/20  14:10    


 


Lipase  244.5 U/L ()   04/02/20  14:10    











Impressions: 


                                        





Abdomen/Pelvis CT  04/02/20 13:45


IMPRESSION:


1. Interval partial gastrectomy with marked distention and fluid retention in 

the residual stomach and distal esophagus suggestive of obstruction of the 

gastrojejunal anastomosis.  The distal small bowel is decompressed.


2. There is some inflammatory change and fluid involving loops of small bowel in

the anterior right abdomen which may represent acute enteritis or inflammatory 

change at the distal jejunojejunal anastomosis.  No evidence of peritoneal 

abscess or perforation.


3. Small pericardial effusion.


 








KUB X-Ray  04/02/20 16:40


IMPRESSION:  Nasogastric tube tip and side port in the stomach


 








KUB X-Ray  04/04/20 11:49


IMPRESSION:  Nasogastric tube tip in the stomach, side port at the GE junction

## 2020-10-27 ENCOUNTER — HOSPITAL ENCOUNTER (OUTPATIENT)
Dept: HOSPITAL 62 - RDC | Age: 80
End: 2020-10-27
Attending: NURSE PRACTITIONER
Payer: MEDICARE

## 2020-10-27 VITALS — SYSTOLIC BLOOD PRESSURE: 157 MMHG | DIASTOLIC BLOOD PRESSURE: 67 MMHG

## 2020-10-27 DIAGNOSIS — Z03.818: Primary | ICD-10-CM

## 2020-10-27 LAB
A TYPE INFLUENZA AG: NEGATIVE
B INFLUENZA AG: NEGATIVE

## 2020-10-27 PROCEDURE — 87880 STREP A ASSAY W/OPTIC: CPT

## 2020-10-27 PROCEDURE — 87804 INFLUENZA ASSAY W/OPTIC: CPT

## 2020-10-27 PROCEDURE — C9803 HOPD COVID-19 SPEC COLLECT: HCPCS

## 2020-10-27 PROCEDURE — 87070 CULTURE OTHR SPECIMN AEROBIC: CPT

## 2020-10-27 PROCEDURE — 87635 SARS-COV-2 COVID-19 AMP PRB: CPT

## 2020-10-27 NOTE — ER [facility] ASSESSMENT REPORT
Intake





- In the Last 14 days


Have you traveled outside [place]?: No


Have you been in close contact with someone CONFIRMED: No


Worked in Healthcare?: No





- Symptoms


Subjective Fever(Felt feverish): No


Chills: No


Muscule Aches: No


Runny Nose: No


Sore Throat: No


Cough (New or worsening chronic cough): No


Shortness of breath: No


Nausea or Vomiting: No


Headache: No


Abdominal Pain: No


Diarrhea(3 or more loose stools in last 24 hours): No





- Do you have any of the following


Chronic lung disease: Asthma or emphysema or COPD: No


Cystic Fibrosis: No


Diabetes: No


High Blood Pressure: Yes


Cardiovascular Disease: Yes


Chronic Kidney Disease: No


Chronic Liver Disease: No


Chronic blood disorder like Sickle Cell Disease: No


Weak immune system due to disease or medication: No


Neurologic condition that limits movement: No


Developmental delay - Moderate to Severe: No


Recent (within past 2 weeks) or current Pregnancy: No


Morbid Obesity (>100 pounds over ideal weight): No





- Objective


Temperature: 98.7 F


Pulse Rate: 57


Respiratory Rate: 14


Blood Pressure: 157/67


O2 Sat by Pulse Oximetry: 96


Objective: 


Given above, testing performed: 
































If Testing Performed:


Test Specimen Type Sent to











General





- General


Information source: Patient


Notes: 





Patient presents to the [facility] for screening for the coronavirus.  Patient denies 

any symptoms at this time although has a history of high blood pressure and is a

former smoker.





- Related Data


Allergies/Adverse Reactions: 


                                        





lisinopril Adverse Reaction (Verified 04/02/20 13:54)


   











Past Medical History





- General


Information source: Patient





- Social History


Smoking Status: Former Smoker


Family History: Reviewed & Not Pertinent





- Past Medical History


Cardiac Medical History: Reports: Hx Hypertension


   Denies: Hx Coronary Artery Disease, Hx Heart Attack


Pulmonary Medical History: 


   Denies: Hx Asthma, Hx Bronchitis, Hx COPD, Hx Pneumonia


Neurological Medical History: Denies: Hx Cerebrovascular Accident, Hx Seizures


GI Medical History: Denies: Hx Hepatitis, Hx Hiatal Hernia, Hx Ulcer


Musculoskeletal Medical History: Reports Hx Arthritis


Psychiatric Medical History: 


   Denies: Hx Depression


Infectious Medical History: Denies: Hx Hepatitis


Past Surgical History: Reports: Hx Cholecystectomy, Hx Hysterectomy





Physical Exam





- Notes


Notes: 





The patient was evaluated during the global Covid 19 pandemic, and that 

diagnosis was suspected/considered upon their initial presentation.  Their 

evaluation, treatment and testing was consistent with current guidelines for 

patients who present with complaints or symptoms that may be related to Covid 

19.





Full physical exam could not be performed due to covid 19 isolation protocols.





Constitutional: Nontoxic appearance, no acute distress


Eyes: Nonicteric, extraocular movements intact, sclera clear


Cardiovascular: Heart rate and rhythm regular, no JVD


Respiratory: Breath sounds clear bilaterally, nonlabored breathing, no use of 

accessory muscles, no tachypnea


Gastrointestinal: Abdomen not distended


Muculoskeletal: Moves all extremities well


Skin: Normal color


Neuro: Awake alert oriented, normal speech


Psych: Normal mood and affect abstract





Diagnostic Results


Laboratory Results: 


Patient presents with concerns about for possible Covid 19.  Patient does not 

have emergency worrying symptoms such as difficulty breathing, shortness of 

breath, chest pain, pressure, confusion or cyanosis.  Patient appears suitable 

for discharge as  vital signs are stable and patient is nontoxic in appearance. 

Good return precautions have been discussed with patient, patient verbalized 

understanding and is agreeable with discharge plan of care at this time.





Patient Education/Counseling


Counseling/Education: 





Patient was provided with discharge information including:





As a person under investigation for Covid 19, the [place] department of 

Health and Human Services, division of public health advises you to adhere to 

the following guidance until your test results are reported to you.  If your 

test result is positive, you will receive additional information from your 

provider and your local health department at that time.





Remain at home until you are cleared by the health provider or public health 

authorities.





Keep a log of visitors to your home, notify any visitors to your home of your 

isolation status.





If you plan to move to a new address or leave the county, notify the local 

health department in your County.





Call your doctor or seek care if you have an urgent medical need.  Before 

seeking medical care, call ahead to get instructions from the provider before 

arriving at the medical office clinic or hospital.  Notify them that you are 

being tested for the virus that causes Covid 19 so that arrangements can be 

made, as necessary, to prevent transmission to others in the healthcare setting.

 Next, notify the local health department in your county.





If a medical emergency arises and you need to call 911, inform the first 

responders that you are being tested for the virus that causes Covid 19.  Next, 

notify the local health department in your county.





[facility] Discharge





- Discharge


Clinical Impression: 


 Encounter for screening laboratory testing for COVID-19 virus





Condition: Stable


Disposition: Home; Selfcare